# Patient Record
Sex: FEMALE | Race: WHITE | NOT HISPANIC OR LATINO | Employment: FULL TIME | URBAN - METROPOLITAN AREA
[De-identification: names, ages, dates, MRNs, and addresses within clinical notes are randomized per-mention and may not be internally consistent; named-entity substitution may affect disease eponyms.]

---

## 2018-04-16 ENCOUNTER — HOSPITAL ENCOUNTER (EMERGENCY)
Facility: HOSPITAL | Age: 58
Discharge: HOME | End: 2018-04-16
Attending: EMERGENCY MEDICINE
Payer: COMMERCIAL

## 2018-04-16 ENCOUNTER — APPOINTMENT (EMERGENCY)
Dept: RADIOLOGY | Facility: HOSPITAL | Age: 58
End: 2018-04-16
Attending: EMERGENCY MEDICINE
Payer: COMMERCIAL

## 2018-04-16 VITALS
RESPIRATION RATE: 18 BRPM | DIASTOLIC BLOOD PRESSURE: 76 MMHG | SYSTOLIC BLOOD PRESSURE: 171 MMHG | HEIGHT: 63 IN | WEIGHT: 200 LBS | HEART RATE: 81 BPM | BODY MASS INDEX: 35.44 KG/M2 | OXYGEN SATURATION: 98 % | TEMPERATURE: 98.3 F

## 2018-04-16 DIAGNOSIS — E80.6 HYPERBILIRUBINEMIA: ICD-10-CM

## 2018-04-16 DIAGNOSIS — R74.01 TRANSAMINITIS: ICD-10-CM

## 2018-04-16 DIAGNOSIS — R19.7 DIARRHEA, UNSPECIFIED TYPE: Primary | ICD-10-CM

## 2018-04-16 DIAGNOSIS — F41.9 ANXIETY: ICD-10-CM

## 2018-04-16 DIAGNOSIS — R00.0 TACHYCARDIA: ICD-10-CM

## 2018-04-16 DIAGNOSIS — E87.20 LACTIC ACIDOSIS: ICD-10-CM

## 2018-04-16 LAB
ALBUMIN SERPL-MCNC: 4 G/DL (ref 3.4–5)
ALP SERPL-CCNC: 104 IU/L (ref 35–126)
ALT SERPL-CCNC: 136 IU/L (ref 11–54)
ANION GAP SERPL CALC-SCNC: 15 MEQ/L (ref 3–15)
AST SERPL-CCNC: 183 IU/L (ref 15–41)
ATRIAL RATE: 104
BACTERIA #/AREA URNS HPF: ABNORMAL /HPF
BASE EXCESS BLDV CALC-SCNC: -0.4 MMOL/L
BASOPHILS # BLD: 0.06 K/UL (ref 0.01–0.1)
BASOPHILS NFR BLD: 0.6 %
BILIRUB SERPL-MCNC: 2.3 MG/DL (ref 0.3–1.2)
BILIRUB UR QL STRIP.AUTO: NEGATIVE MG/DL
BUN SERPL-MCNC: 5 MG/DL (ref 8–20)
CALCIUM SERPL-MCNC: 8.9 MG/DL (ref 8.9–10.3)
CHLORIDE SERPL-SCNC: 96 MMOL/L (ref 98–109)
CLARITY UR REFRACT.AUTO: ABNORMAL
CO2 BLDV-SCNC: 24.1 MMOL/L (ref 22–32)
CO2 SERPL-SCNC: 22 MMOL/L (ref 22–32)
COLOR UR AUTO: YELLOW
CREAT SERPL-MCNC: 0.6 MG/DL (ref 0.6–1.1)
DIFFERENTIAL METHOD BLD: ABNORMAL
EOSINOPHIL # BLD: 0.01 K/UL (ref 0.04–0.36)
EOSINOPHIL NFR BLD: 0.1 %
ERYTHROCYTE [DISTWIDTH] IN BLOOD BY AUTOMATED COUNT: 13.3 % (ref 11.7–14.4)
FIO2 ON VENT: ABNORMAL %
GFR SERPL CREATININE-BSD FRML MDRD: >60 ML/MIN/1.73M*2
GLUCOSE SERPL-MCNC: 156 MG/DL (ref 70–99)
GLUCOSE UR STRIP.AUTO-MCNC: NEGATIVE MG/DL
HCO3 BLDV-SCNC: 23.1 MMOL/L (ref 21–28)
HCT VFR BLDCO AUTO: 41.7 % (ref 35–45)
HGB BLD-MCNC: 14.9 G/DL (ref 11.8–15.7)
HGB UR QL STRIP.AUTO: NEGATIVE
HYALINE CASTS #/AREA URNS LPF: ABNORMAL /LPF
IMM GRANULOCYTES # BLD AUTO: 0.04 K/UL (ref 0–0.08)
IMM GRANULOCYTES NFR BLD AUTO: 0.4 %
INHALED O2 CONCENTRATION: ABNORMAL %
KETONES UR STRIP.AUTO-MCNC: NEGATIVE MG/DL
LACTATE SERPL-SCNC: 2.8 MMOL/L (ref 0.4–2)
LACTATE SERPL-SCNC: 3.6 MMOL/L (ref 0.4–2)
LEUKOCYTE ESTERASE UR QL STRIP.AUTO: NEGATIVE
LIPASE SERPL-CCNC: 28 U/L (ref 20–51)
LYMPHOCYTES # BLD: 1.24 K/UL (ref 1.2–3.5)
LYMPHOCYTES NFR BLD: 12.8 %
MAGNESIUM SERPL-MCNC: 2.1 MG/DL (ref 1.8–2.5)
MCH RBC QN AUTO: 33.6 PG (ref 28–33.2)
MCHC RBC AUTO-ENTMCNC: 35.7 G/DL (ref 32.2–35.5)
MCV RBC AUTO: 94.1 FL (ref 83–98)
MONOCYTES # BLD: 0.91 K/UL (ref 0.28–0.8)
MONOCYTES NFR BLD: 9.4 %
NEUTROPHILS # BLD: 7.4 K/UL (ref 1.7–7)
NEUTS SEG NFR BLD: 76.7 %
NITRITE UR QL STRIP.AUTO: NEGATIVE
NRBC BLD-RTO: 0 %
P AXIS: 45
PCO2 BLDV: 34 MMHG (ref 41–51)
PDW BLD AUTO: 9.5 FL (ref 9.4–12.3)
PH BLDV: 7.44 [PH] (ref 7.32–7.42)
PH UR STRIP.AUTO: 6.5 [PH]
PLATELET # BLD AUTO: 156 K/UL (ref 150–369)
PO2 BLDV: 48 MMHG (ref 25–40)
POTASSIUM SERPL-SCNC: 3.9 MMOL/L (ref 3.6–5.1)
PR INTERVAL: 178
PROT SERPL-MCNC: 7.7 G/DL (ref 6–8.2)
PROT UR QL STRIP.AUTO: NEGATIVE
QRS DURATION: 78
QT INTERVAL: 336
QTC CALCULATION(BAZETT): 441
R AXIS: 62
RBC # BLD AUTO: 4.43 M/UL (ref 3.93–5.22)
RBC #/AREA URNS HPF: ABNORMAL /HPF
SODIUM SERPL-SCNC: 133 MMOL/L (ref 136–144)
SP GR UR REFRACT.AUTO: <=1.005
SQUAMOUS #/AREA URNS HPF: 1 /HPF
T WAVE AXIS: 40
UROBILINOGEN UR STRIP-ACNC: 0.2 EU/DL
VENTRICULAR RATE: 104
WBC # BLD AUTO: 9.66 K/UL (ref 3.8–10.5)
WBC #/AREA URNS HPF: ABNORMAL /HPF

## 2018-04-16 PROCEDURE — 83605 ASSAY OF LACTIC ACID: CPT | Performed by: EMERGENCY MEDICINE

## 2018-04-16 PROCEDURE — 25800000 HC PHARMACY IV SOLUTIONS: Performed by: EMERGENCY MEDICINE

## 2018-04-16 PROCEDURE — 93005 ELECTROCARDIOGRAM TRACING: CPT | Performed by: EMERGENCY MEDICINE

## 2018-04-16 PROCEDURE — 83735 ASSAY OF MAGNESIUM: CPT | Performed by: EMERGENCY MEDICINE

## 2018-04-16 PROCEDURE — 81001 URINALYSIS AUTO W/SCOPE: CPT | Performed by: EMERGENCY MEDICINE

## 2018-04-16 PROCEDURE — 36415 COLL VENOUS BLD VENIPUNCTURE: CPT | Performed by: EMERGENCY MEDICINE

## 2018-04-16 PROCEDURE — 96361 HYDRATE IV INFUSION ADD-ON: CPT

## 2018-04-16 PROCEDURE — 80053 COMPREHEN METABOLIC PANEL: CPT | Performed by: EMERGENCY MEDICINE

## 2018-04-16 PROCEDURE — 3E0337Z INTRODUCTION OF ELECTROLYTIC AND WATER BALANCE SUBSTANCE INTO PERIPHERAL VEIN, PERCUTANEOUS APPROACH: ICD-10-PCS | Performed by: EMERGENCY MEDICINE

## 2018-04-16 PROCEDURE — 85025 COMPLETE CBC W/AUTO DIFF WBC: CPT | Performed by: EMERGENCY MEDICINE

## 2018-04-16 PROCEDURE — 80074 ACUTE HEPATITIS PANEL: CPT | Performed by: EMERGENCY MEDICINE

## 2018-04-16 PROCEDURE — 83690 ASSAY OF LIPASE: CPT | Performed by: EMERGENCY MEDICINE

## 2018-04-16 PROCEDURE — 76705 ECHO EXAM OF ABDOMEN: CPT

## 2018-04-16 PROCEDURE — 99284 EMERGENCY DEPT VISIT MOD MDM: CPT | Mod: 25

## 2018-04-16 PROCEDURE — 96360 HYDRATION IV INFUSION INIT: CPT

## 2018-04-16 PROCEDURE — 82803 BLOOD GASES ANY COMBINATION: CPT | Performed by: EMERGENCY MEDICINE

## 2018-04-16 RX ORDER — LORAZEPAM 0.5 MG/1
0.5 TABLET ORAL EVERY 6 HOURS PRN
Qty: 14 TABLET | Refills: 0 | Status: SHIPPED | OUTPATIENT
Start: 2018-04-16 | End: 2020-03-24 | Stop reason: ALTCHOICE

## 2018-04-16 RX ORDER — MORPHINE SULFATE 4 MG/ML
4 INJECTION, SOLUTION INTRAMUSCULAR; INTRAVENOUS ONCE
Status: DISCONTINUED | OUTPATIENT
Start: 2018-04-16 | End: 2018-04-16 | Stop reason: HOSPADM

## 2018-04-16 RX ORDER — HYDROCHLOROTHIAZIDE 25 MG/1
25 TABLET ORAL DAILY
COMMUNITY
End: 2020-03-24 | Stop reason: ALTCHOICE

## 2018-04-16 RX ADMIN — SODIUM CHLORIDE 1000 ML: 9 INJECTION, SOLUTION INTRAVENOUS at 13:01

## 2018-04-16 ASSESSMENT — ENCOUNTER SYMPTOMS
CHEST TIGHTNESS: 0
ABDOMINAL PAIN: 1
CHILLS: 0
DIARRHEA: 1
RECENT COUGH: 1
FEVER: 0
COUGH: 1
SHORTNESS OF BREATH: 0

## 2018-04-16 NOTE — DISCHARGE INSTRUCTIONS
Please review all discharge instructions as discussed.  Please be re-evaluated by your primary doctor as soon as possible and be sure to review all laboratory, radiology and other testing with your doctor.  Should your symptoms worsen or not improve, return to emergency room immediately.     Please stay well hydrated and use Pepto-bismol as needed for diarrhea

## 2018-04-16 NOTE — ED PROVIDER NOTES
HPI     Chief Complaint   Patient presents with   • Diarrhea   • Abdominal Pain       Pt states she has been having constant black and tarry diarrhea for about 2 weeks. She went to her PCP today and was sent to the ED for evaluation. Pt states she has been under a lot of stress as well over the last two weeks and states she works for [employer]. 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 Pt denies taking any iron supplements.        History provided by:  Patient and friend   used: No    Diarrhea   Quality:  Watery and black and tarry  Severity:  Moderate  Onset quality:  Gradual  Duration:  2 weeks  Timing:  Constant  Progression:  Unchanged  Relieved by:  None tried  Worsened by:  Nothing  Ineffective treatments:  None tried  Associated symptoms: abdominal pain and cough (pt states she coughed up blood yesterday)    Associated symptoms: no chills and no fever    Abdominal Pain   Associated symptoms: cough (Pt states she coughed up blood yesterday) and diarrhea    Associated symptoms: no chest pain, no chills, no fever and no shortness of breath         Patient History     Past Medical History:   Diagnosis Date   • Anxiety        History reviewed. No pertinent surgical history.    History reviewed. No pertinent family history.    Social History   Substance Use Topics   • Smoking status: Never Smoker   • Smokeless tobacco: Never Used   • Alcohol use No       Systems Reviewed from Nursing Triage:          Review of Systems     Review of Systems   Constitutional: Negative for chills and fever.   Respiratory: Positive for cough (Pt states she coughed up blood yesterday). Negative for chest tightness and shortness of breath.    Cardiovascular: Negative for chest pain.   Gastrointestinal: Positive for abdominal pain and diarrhea.   All other systems reviewed and are negative.       Physical Exam     ED Triage Vitals [04/16/18 1127]   Temp Heart Rate Resp BP  "SpO2   36.8 °C (98.3 °F) (!) 118 18 (!) 166/88 96 %      Temp Source Heart Rate Source Patient Position BP Location FiO2 (%) (Set)   Oral -- Sitting Right upper arm --       Pulse Ox %: 96 % (04/16/18 1155)  Pulse Ox Interpretation: Normal (04/16/18 1155)          Patient Vitals for the past 24 hrs:   BP Temp Temp src Pulse Resp SpO2 Height Weight   04/16/18 1127 (!) 166/88 36.8 °C (98.3 °F) Oral (!) 118 18 96 % 1.6 m (5' 3\") 90.7 kg (200 lb)           Physical Exam   Constitutional: She appears well-developed and well-nourished. No distress.   HENT:   Head: Normocephalic and atraumatic.   Eyes: Conjunctivae are normal. No scleral icterus.   Neck: Neck supple. No JVD present.   Cardiovascular: Normal rate, regular rhythm and normal heart sounds.    Pulmonary/Chest: Effort normal and breath sounds normal. No respiratory distress.   Abdominal: Soft. There is no tenderness.   Genitourinary:   Genitourinary Comments: Heme negative.  Dark green stool.   Musculoskeletal: She exhibits no edema.   Neurological: She is alert. She has normal strength.   Skin: Skin is warm and dry.   Psychiatric: She has a normal mood and affect. Her behavior is normal.   Nursing note and vitals reviewed.           Procedures    ED Course & MDM     Labs Reviewed   CBC AND DIFFERENTIAL    Narrative:     The following orders were created for panel order CBC and differential.  Procedure                               Abnormality         Status                     ---------                               -----------         ------                     CBC[[id number]]                                                                          Diff Count[[id number]]                                                                     Please view results for these tests on the individual orders.   COMPREHENSIVE METABOLIC PANEL   LACTATE, VENOUS   LIPASE   URINALYSIS REFLEX CULTURE    Narrative:     The following orders were created for panel order Urinalysis " with Reflex Culture.  Procedure                               Abnormality         Status                     ---------                               -----------         ------                     UA Reflex to Culture (Mac...[[id number]]                                                   Please view results for these tests on the individual orders.   CBC   DIFF COUNT   UA REFLEX CULTURE (MACROSCOPIC)       ECG 12 lead    (Results Pending)           [facility]         ED Course as of Apr 16 1847   Mon Apr 16, 2018   1241 58-year-old female history of anxiety complains of 1 week of dark green's stool and diarrhea.  Has occasional low abdominal tightness.  Denies urinary complaints or fever.  No nausea, vomiting, or bloody stools.  Saw PCP today who recommended patient go to ED for further evaluation.  Denies new meds, recent travel or sick contacts.  No family history of Crohn's or UC.Vitals noted, well-appearing, soft abdomen, heme negative stoolsWe will check labs, hydrate and reassess.    Lactate 3.6--prob due to starvation ketosis; doubt severe sepsis. Will check VBG  [SUZAN]   1429 VBG stable  Na 133  / with TB 2.3  Pulse 102  Pt asking to eat. Says she has not eaten for 3 days because she has been 'stressed' at work.  [SUZAN]   1610 RUQ sono: Hepatomegaly and hepatic steatosis.  [SUZAN]   1610 All details of case d/w GI fellow who believes if stable for outpatient followup.  [SUZAN]   1644 Repeat lactate 2.8  [SUZAN]      ED Course User Index  [SUZAN] Cheryl Marquis MD         Clinical Impressions as of Apr 16 1847   Diarrhea, unspecified type   Tachycardia   Lactic acidosis   Transaminitis   Hyperbilirubinemia   Anxiety     Disposition:           Scribe Attestation  By signing my name below, I, Leonard Bernard, attest that this documentation has been prepared under the direction and in the presence of Dr. Marquis.    4/16/2018 12:17 PM    I, Cheryl Marquis, personally performed the services described in this documentation,  as documented by the scribe in my presence, and it is both accurate and complete.  4/16/2018 12:40 PM           Leonard Bernard  04/16/18 1219       Cheryl Marquis MD  04/16/18 1742

## 2018-04-16 NOTE — LETTER
April 16, 2018     Patient: Yasmeen Linares   YOB: 1960   Date of Visit: 4/16/2018       To Whom it May Concern:    Yasmeen Linares was seen in my clinic on 4/16/2018  . Please excuse Yasmeen for her absence from work on this day and 4/17/18.    If you have any questions or concerns, please don't hesitate to call.         Sincerely,

## 2018-04-17 LAB
HAV IGM SER QL: NONREACTIVE S/CO
HBV CORE IGM SER QL: NONREACTIVE S/CO
HBV SURFACE AG SER QL: NONREACTIVE S/CO
HCV AB SER QL: NONREACTIVE S/CO

## 2018-05-02 ENCOUNTER — TRANSCRIBE ORDERS (OUTPATIENT)
Dept: SCHEDULING | Age: 58
End: 2018-05-02

## 2018-05-02 DIAGNOSIS — R94.5 NONSPECIFIC ABNORMAL RESULTS OF LIVER FUNCTION STUDY: Primary | ICD-10-CM

## 2018-06-07 ENCOUNTER — TRANSCRIBE ORDERS (OUTPATIENT)
Dept: SCHEDULING | Age: 58
End: 2018-06-07

## 2018-06-07 DIAGNOSIS — R14.0 ABDOMINAL DISTENSION (GASEOUS): Primary | ICD-10-CM

## 2018-06-23 ENCOUNTER — HOSPITAL ENCOUNTER (OUTPATIENT)
Dept: RADIOLOGY | Facility: HOSPITAL | Age: 58
Discharge: HOME | End: 2018-06-23
Attending: INTERNAL MEDICINE
Payer: COMMERCIAL

## 2018-06-23 DIAGNOSIS — R14.0 ABDOMINAL DISTENSION (GASEOUS): ICD-10-CM

## 2018-06-23 PROCEDURE — 25500000 HC DRUGS/INCIDENT RAD: Performed by: RADIOLOGY

## 2018-06-23 PROCEDURE — 63600105 HC IODINE BASED CONTRAST: Performed by: RADIOLOGY

## 2018-06-23 PROCEDURE — 74177 CT ABD & PELVIS W/CONTRAST: CPT

## 2018-06-23 RX ADMIN — IOHEXOL 125 ML: 350 INJECTION, SOLUTION INTRAVENOUS at 13:43

## 2018-06-23 RX ADMIN — BARIUM SULFATE 900 ML: 21 SUSPENSION ORAL at 14:15

## 2019-04-04 ENCOUNTER — LAB REQUISITION (OUTPATIENT)
Dept: LAB | Facility: HOSPITAL | Age: 59
End: 2019-04-04
Attending: NURSE PRACTITIONER
Payer: COMMERCIAL

## 2019-04-04 DIAGNOSIS — F10.239 ALCOHOL DEPENDENCE WITH WITHDRAWAL (CMS/HCC): ICD-10-CM

## 2019-04-04 LAB — AMMONIA PLAS-SCNC: 97 UMOL/L (ref 11–35)

## 2019-04-04 PROCEDURE — 82140 ASSAY OF AMMONIA: CPT | Performed by: NURSE PRACTITIONER

## 2019-04-16 ENCOUNTER — LAB REQUISITION (OUTPATIENT)
Dept: LAB | Facility: HOSPITAL | Age: 59
End: 2019-04-16
Attending: NURSE PRACTITIONER
Payer: COMMERCIAL

## 2019-04-16 DIAGNOSIS — F10.239 ALCOHOL DEPENDENCE WITH WITHDRAWAL (CMS/HCC): ICD-10-CM

## 2019-04-16 LAB
ALBUMIN SERPL-MCNC: 3.8 G/DL (ref 3.4–5)
ALP SERPL-CCNC: 114 IU/L (ref 35–126)
ALT SERPL-CCNC: 115 IU/L (ref 11–54)
AST SERPL-CCNC: 120 IU/L (ref 15–41)
BILIRUB DIRECT SERPL-MCNC: 0.9 MG/DL
BILIRUB SERPL-MCNC: 2.2 MG/DL (ref 0.3–1.2)
PROT SERPL-MCNC: 6.9 G/DL (ref 6–8.2)

## 2019-04-16 PROCEDURE — 80076 HEPATIC FUNCTION PANEL: CPT | Performed by: NURSE PRACTITIONER

## 2019-04-22 ENCOUNTER — LAB REQUISITION (OUTPATIENT)
Dept: LAB | Facility: HOSPITAL | Age: 59
End: 2019-04-22
Attending: HOSPITALIST
Payer: COMMERCIAL

## 2019-04-22 ENCOUNTER — TRANSCRIBE ORDERS (OUTPATIENT)
Dept: SCHEDULING | Age: 59
End: 2019-04-22

## 2019-04-22 DIAGNOSIS — K76.6 PORTAL HYPERTENSION (CMS/HCC): ICD-10-CM

## 2019-04-22 DIAGNOSIS — K70.31 ALCOHOLIC CIRRHOSIS OF LIVER WITH ASCITES (CMS/HCC): Primary | ICD-10-CM

## 2019-04-22 DIAGNOSIS — F10.239 ALCOHOL DEPENDENCE WITH WITHDRAWAL (CMS/HCC): ICD-10-CM

## 2019-04-22 LAB
ANION GAP SERPL CALC-SCNC: 11 MEQ/L (ref 3–15)
BUN SERPL-MCNC: 5 MG/DL (ref 8–20)
CALCIUM SERPL-MCNC: 9.6 MG/DL (ref 8.9–10.3)
CHLORIDE SERPL-SCNC: 109 MEQ/L (ref 98–109)
CO2 SERPL-SCNC: 21 MEQ/L (ref 22–32)
CREAT SERPL-MCNC: 0.6 MG/DL
GFR SERPL CREATININE-BSD FRML MDRD: >60 ML/MIN/1.73M*2
GLUCOSE SERPL-MCNC: 115 MG/DL (ref 70–99)
MAGNESIUM SERPL-MCNC: 1.8 MG/DL (ref 1.8–2.5)
POTASSIUM SERPL-SCNC: 4.1 MEQ/L (ref 3.6–5.1)
SODIUM SERPL-SCNC: 141 MEQ/L (ref 136–144)

## 2019-04-22 PROCEDURE — 80048 BASIC METABOLIC PNL TOTAL CA: CPT | Performed by: HOSPITALIST

## 2019-04-22 PROCEDURE — 83735 ASSAY OF MAGNESIUM: CPT | Performed by: HOSPITALIST

## 2019-04-24 ENCOUNTER — HOSPITAL ENCOUNTER (OUTPATIENT)
Dept: RADIOLOGY | Age: 59
Discharge: HOME | End: 2019-04-24
Attending: INTERNAL MEDICINE
Payer: COMMERCIAL

## 2019-04-24 DIAGNOSIS — K76.6 PORTAL HYPERTENSION (CMS/HCC): ICD-10-CM

## 2019-04-24 DIAGNOSIS — K70.31 ALCOHOLIC CIRRHOSIS OF LIVER WITH ASCITES (CMS/HCC): ICD-10-CM

## 2019-04-24 RX ORDER — GADOBUTROL 604.72 MG/ML
8 INJECTION INTRAVENOUS ONCE
Status: COMPLETED | OUTPATIENT
Start: 2019-04-24 | End: 2019-04-24

## 2019-04-24 RX ADMIN — GADOBUTROL 8 ML: 604.72 INJECTION INTRAVENOUS at 10:19

## 2019-04-30 ENCOUNTER — HOSPITAL ENCOUNTER (OUTPATIENT)
Facility: HOSPITAL | Age: 59
Discharge: HOME | End: 2019-04-30
Attending: INTERNAL MEDICINE | Admitting: INTERNAL MEDICINE
Payer: COMMERCIAL

## 2019-04-30 ENCOUNTER — ANESTHESIA (OUTPATIENT)
Dept: ENDOSCOPY | Facility: HOSPITAL | Age: 59
End: 2019-04-30
Payer: COMMERCIAL

## 2019-04-30 ENCOUNTER — ANESTHESIA EVENT (OUTPATIENT)
Dept: ENDOSCOPY | Facility: HOSPITAL | Age: 59
End: 2019-04-30
Payer: COMMERCIAL

## 2019-04-30 VITALS
TEMPERATURE: 97.7 F | HEIGHT: 64 IN | BODY MASS INDEX: 29.88 KG/M2 | OXYGEN SATURATION: 96 % | HEART RATE: 73 BPM | DIASTOLIC BLOOD PRESSURE: 67 MMHG | SYSTOLIC BLOOD PRESSURE: 125 MMHG | RESPIRATION RATE: 17 BRPM | WEIGHT: 175 LBS

## 2019-04-30 PROCEDURE — 37000002 HC ANESTHESIA MAC: Performed by: INTERNAL MEDICINE

## 2019-04-30 PROCEDURE — 63600000 HC DRUGS/DETAIL CODE: Mod: JW | Performed by: NURSE ANESTHETIST, CERTIFIED REGISTERED

## 2019-04-30 PROCEDURE — 0DJ08ZZ INSPECTION OF UPPER INTESTINAL TRACT, VIA NATURAL OR ARTIFICIAL OPENING ENDOSCOPIC: ICD-10-PCS | Performed by: INTERNAL MEDICINE

## 2019-04-30 PROCEDURE — 75000081 HC EGD W WO BRUSH WASH: Performed by: INTERNAL MEDICINE

## 2019-04-30 PROCEDURE — 25800000 HC PHARMACY IV SOLUTIONS: Performed by: NURSE ANESTHETIST, CERTIFIED REGISTERED

## 2019-04-30 RX ORDER — IBUPROFEN 200 MG
16-32 TABLET ORAL AS NEEDED
Status: CANCELLED | OUTPATIENT
Start: 2019-04-30 | End: 2019-07-29

## 2019-04-30 RX ORDER — DEXTROSE 50 % IN WATER (D50W) INTRAVENOUS SYRINGE
25 AS NEEDED
Status: DISCONTINUED | OUTPATIENT
Start: 2019-04-30 | End: 2019-04-30 | Stop reason: HOSPADM

## 2019-04-30 RX ORDER — DEXTROSE 40 %
15-30 GEL (GRAM) ORAL AS NEEDED
Status: DISCONTINUED | OUTPATIENT
Start: 2019-04-30 | End: 2019-04-30 | Stop reason: HOSPADM

## 2019-04-30 RX ORDER — PROPOFOL 200MG/20ML
SYRINGE (ML) INTRAVENOUS AS NEEDED
Status: DISCONTINUED | OUTPATIENT
Start: 2019-04-30 | End: 2019-04-30 | Stop reason: SURG

## 2019-04-30 RX ORDER — DEXTROSE 50 % IN WATER (D50W) INTRAVENOUS SYRINGE
25 AS NEEDED
Status: CANCELLED | OUTPATIENT
Start: 2019-04-30 | End: 2019-07-29

## 2019-04-30 RX ORDER — IBUPROFEN 200 MG
16-32 TABLET ORAL AS NEEDED
Status: DISCONTINUED | OUTPATIENT
Start: 2019-04-30 | End: 2019-04-30 | Stop reason: HOSPADM

## 2019-04-30 RX ORDER — PROPOFOL 10 MG/ML
INJECTION, EMULSION INTRAVENOUS CONTINUOUS PRN
Status: DISCONTINUED | OUTPATIENT
Start: 2019-04-30 | End: 2019-04-30 | Stop reason: SURG

## 2019-04-30 RX ORDER — SODIUM CHLORIDE 9 MG/ML
INJECTION, SOLUTION INTRAVENOUS CONTINUOUS
Status: DISCONTINUED | OUTPATIENT
Start: 2019-04-30 | End: 2019-04-30 | Stop reason: HOSPADM

## 2019-04-30 RX ORDER — SODIUM CHLORIDE 9 MG/ML
INJECTION, SOLUTION INTRAVENOUS CONTINUOUS PRN
Status: DISCONTINUED | OUTPATIENT
Start: 2019-04-30 | End: 2019-04-30 | Stop reason: SURG

## 2019-04-30 RX ORDER — DEXTROSE 40 %
15-30 GEL (GRAM) ORAL AS NEEDED
Status: CANCELLED | OUTPATIENT
Start: 2019-04-30 | End: 2019-07-29

## 2019-04-30 RX ADMIN — PROPOFOL 20 MG: 10 INJECTION, EMULSION INTRAVENOUS at 09:22

## 2019-04-30 RX ADMIN — PROPOFOL 120 MG: 10 INJECTION, EMULSION INTRAVENOUS at 09:18

## 2019-04-30 RX ADMIN — PROPOFOL 40 MG: 10 INJECTION, EMULSION INTRAVENOUS at 09:24

## 2019-04-30 RX ADMIN — PROPOFOL 160 MCG/KG/MIN: 10 INJECTION, EMULSION INTRAVENOUS at 09:18

## 2019-04-30 RX ADMIN — SODIUM CHLORIDE: 900 INJECTION, SOLUTION INTRAVENOUS at 09:13

## 2019-04-30 ASSESSMENT — PAIN SCALES - GENERAL: PAIN_LEVEL: 0

## 2019-04-30 ASSESSMENT — PAIN - FUNCTIONAL ASSESSMENT
PAIN_FUNCTIONAL_ASSESSMENT: NO/DENIES PAIN

## 2019-04-30 NOTE — OP NOTE
_______________________________________________________________________________  Patient Name: Yasmeen Linares           Procedure Date: 4/30/2019 8:52 AM  MRN: [id number]                     Account Number: [id number]  YOB: 1960              Age: 59  Gender: Female                        Note Status: Finalized  Attending MD: KIMBERLY ORDONEZ  _______________________________________________________________________________  Procedure:           Upper GI endoscopy  Indications:         Cirrhosis rule out esophageal varices  Providers:           KODI GREEN MD~PETER (Doctor)  Referring MD:        MICHELET ALLAN  Requesting Provider:  Medicines:           See the Anesthesia note for documentation of the  administered medications  Complications:       No immediate complications. Estimated blood loss: None.  _______________________________________________________________________________  Procedure:           Pre-Anesthesia Assessment:  - Prior to the procedure, a History and Physical was  performed, and patient medications, allergies and  sensitivities were reviewed. The patient's tolerance of  previous anesthesia was reviewed.  - The risks and benefits of the procedure and the  sedation options and risks were discussed with the  patient. All questions were answered and informed  consent was obtained.  After obtaining informed consent, the endoscope was  passed under direct vision. Throughout the procedure,  the patient's blood pressure, pulse, and oxygen  saturations were monitored continuously. The Endoscope  was introduced through the mouth, and advanced to the  second part of duodenum. The upper GI endoscopy was  accomplished without difficulty. The patient tolerated  the procedure.  Findings:  The examined esophagus was normal. There were no varices appreciated on  careful examination of the esophagus.  There is no endoscopic evidence of erythema, erythema or inflammatory  changes  suggestive of gastritis, inflammation, mucosal abnormalities or  portal hypertension gastropathy in the entire examined stomach.  The examined duodenum was normal.  Impression:          - Normal esophagus.  - Normal examined duodenum.  - No specimens collected.  Recommendation:      - Discharge patient to home (ambulatory).  - Repeat the upper endoscopy in 2 years for surveillance.  - Return to GI office as previously scheduled.  - Patient has a contact number available for  emergencies. The signs and symptoms of potential delayed  complications were discussed with the patient. Return to  normal activities tomorrow. Written discharge  instructions were provided to the patient.  Procedure Code(s):   --- Professional ---  69646, Esophagogastroduodenoscopy, flexible, transoral;  diagnostic, including collection of specimen(s) by  brushing or washing, when performed (separate procedure)  Diagnosis Code(s):   --- Professional ---  K74.60, Unspecified cirrhosis of liver  CPT copyright 2015 American Medical Association. All rights reserved.  The codes documented in this report are preliminary and upon  review may  be revised to meet current compliance requirements.  Attending Participation:  I was present and participated during the entire procedure, including  non-key portions.  _____________________  KODI GREEN MD~PETER  4/30/2019 9:55:17 AM  This report has been signed electronically.  Number of Addenda: 0  Note Initiated On: 4/30/2019 8:52 AM

## 2019-04-30 NOTE — H&P
"   GI Consult Note          Subjective   Yasmeen Linares is a 59 y.o. female who was admitted for Alcoholic cirrhosis of liver without ascites (CMS/HCC) (HCC) [K70.30]  Portal hypertension (CMS/HCC) [K76.6].         Past Medical History:   Diagnosis Date   • Anxiety      No past surgical history on file.  Allergies   Allergen Reactions   • Cefzil [Cefprozil] Shortness of breath       Home Medications:  •  gabapentin, Take 100 mg by mouth daily.  •  hydrochlorothiazide, Take 25 mg by mouth daily.  •  lactulose, Take 10 g by mouth daily.  •  LORazepam, Take 1 tablet (0.5 mg total) by mouth every 6 (six) hours as needed for anxiety for up to 14 doses.  •  melatonin, Take 6 mg by mouth nightly.  •  traZODone, Take 75 mg by mouth nightly.        Physical Exam    Physical Exam  BP (!) 160/75   Temp 36.5 °C (97.7 °F) (Tympanic)   Resp 16   Ht 1.626 m (5' 4\")   Wt 79.4 kg (175 lb)   BMI 30.04 kg/m²     General appearance: no distress  Head: normocephalic  Lungs: clear to auscultation anteriorly   Heart: regular rate   Abdomen: soft, non-tender; bowel sounds normal; no masses, no organomegaly  Neurologic: awake and alert and oriented        Assessment   59 year old female with alcoholic cirrhosis for egd to surveil for varices.             Jerson Lee MD  4/30/2019       "

## 2019-04-30 NOTE — DISCHARGE INSTRUCTIONS
Please see Dr. Lee in the office as scheduled.  Please schedule repeat surveillance in 2 years.      Esophagogastroduodenoscopy, Care After  Refer to this sheet in the next few weeks. These instructions provide you with information about caring for yourself after your procedure. Your health care provider may also give you more specific instructions. Your treatment has been planned according to current medical practices, but problems sometimes occur. Call your health care provider if you have any problems or questions after your procedure.  What can I expect after the procedure?  After the procedure, it is common to have:  · A sore throat.  · Nausea.  · Bloating.  · Dizziness.  · Fatigue.  Follow these instructions at home:  · Do not eat or drink anything until the numbing medicine (local anesthetic) has worn off and your gag reflex has returned. You will know that the local anesthetic has worn off when you can swallow comfortably.  · Do not drive for 24 hours if you received a medicine to help you relax (sedative).  · If your health care provider took a tissue sample for testing during the procedure, make sure to get your test results. This is your responsibility. Ask your health care provider or the department performing the test when your results will be ready.  · Keep all follow-up visits as told by your health care provider. This is important.  Contact a health care provider if:  · You cannot stop coughing.  · You are not urinating.  · You are urinating less than usual.  Get help right away if:  · You have trouble swallowing.  · You cannot eat or drink.  · You have throat or chest pain that gets worse.  · You are dizzy or light-headed.  · You faint.  · You have nausea or vomiting.  · You have chills.  · You have a fever.  · You have severe abdominal pain.  · You have black, tarry, or bloody stools.  This information is not intended to replace advice given to you by your health care provider. Make sure you  discuss any questions you have with your health care provider.  Document Released: 12/04/2013 Document Revised: 05/25/2017 Document Reviewed: 11/10/2016  [employer] Interactive Patient Education © 2018 [employer]

## 2019-04-30 NOTE — ANESTHESIA PREPROCEDURE EVALUATION
Anesthesia ROS/MED HX      Neuro/Psych    Substance abuse (alcohol abuse)  GI/Hepatic   liver disease, portal hypertension and cirrhosis  Endo/Other  Body Habitus: Obese    Past Medical History:   Diagnosis Date   • Anxiety        No past surgical history on file.    Physical Exam    Airway   Mallampati: II   TM distance: >3 FB   Neck ROM: full  Cardiovascular    Rhythm: regular   Rate: normal  Pulmonary    Decreased breath sounds        Anesthesia Plan    Plan: MAC    Technique: MAC     Airway: natural airway / supplemental oxygen   ASA 3  Anesthetic plan and risks discussed with: patient  Induction:    intravenous   Postop Plan:   Patient Disposition: phase II then home   Pain Management: IV analgesics

## 2019-04-30 NOTE — ANESTHESIA POSTPROCEDURE EVALUATION
Patient: Yasmeen Linares    Procedure Summary     Date:  04/30/19 Room / Location:  [facility] (A) / [facility]    Anesthesia Start:  0913 Anesthesia Stop:  0939    Procedure:  EGD (N/A ) Diagnosis:       Alcoholic cirrhosis of liver without ascites (CMS/HCC) (HCC)      Portal hypertension (CMS/HCC)      (ALC without Ascites / Portal Hypertension)    Provider:  Jerson Lee MD Responsible Provider:  Reagan Holden MD    Anesthesia Type:  MAC ASA Status:  3          Anesthesia Type: MAC  PACU Vitals  4/30/2019 0936 - 4/30/2019 1027      4/30/2019 0940 4/30/2019 0945 4/30/2019 0950 4/30/2019 1000    BP: 128/66 - 128/66 125/67    Temp: - 36.5 °C (97.7 °F) - -    Pulse: 86 - 78 73    Resp: 17 16 15 17    SpO2: 95 % - 96 % 96 %            Anesthesia Post Evaluation    Pain score: 0  Pain management: satisfactory to patient  Mode of pain management: IV medication  Patient location during evaluation: PACU  Patient participation: complete - patient participated  Level of consciousness: responsive to touch  Cardiovascular status: acceptable  Airway Patency: adequate  Respiratory status: acceptable  Hydration status: stable  Anesthetic complications: no

## 2019-05-15 ENCOUNTER — TRANSCRIBE ORDERS (OUTPATIENT)
Dept: LAB | Facility: HOSPITAL | Age: 59
End: 2019-05-15

## 2019-05-15 ENCOUNTER — APPOINTMENT (OUTPATIENT)
Dept: LAB | Facility: HOSPITAL | Age: 59
End: 2019-05-15
Attending: INTERNAL MEDICINE
Payer: COMMERCIAL

## 2019-05-15 DIAGNOSIS — I10 ESSENTIAL (PRIMARY) HYPERTENSION: ICD-10-CM

## 2019-05-15 DIAGNOSIS — K70.31 ALCOHOLIC CIRRHOSIS OF LIVER WITH ASCITES (CMS/HCC): ICD-10-CM

## 2019-05-15 DIAGNOSIS — K76.6 PORTAL HYPERTENSION (CMS/HCC): ICD-10-CM

## 2019-05-15 DIAGNOSIS — Z00.01 ENCOUNTER FOR GENERAL ADULT MEDICAL EXAMINATION WITH ABNORMAL FINDINGS: ICD-10-CM

## 2019-05-15 DIAGNOSIS — R94.5 ABNORMAL RESULTS OF LIVER FUNCTION STUDIES: ICD-10-CM

## 2019-05-15 DIAGNOSIS — R73.01 IMPAIRED FASTING GLUCOSE: ICD-10-CM

## 2019-05-15 DIAGNOSIS — K70.31 ALCOHOLIC CIRRHOSIS OF LIVER WITH ASCITES (CMS/HCC): Primary | ICD-10-CM

## 2019-05-15 DIAGNOSIS — R73.01 IMPAIRED FASTING GLUCOSE: Primary | ICD-10-CM

## 2019-05-15 LAB
ALBUMIN SERPL-MCNC: 4 G/DL (ref 3.4–5)
ALP SERPL-CCNC: 90 IU/L (ref 35–126)
ALT SERPL-CCNC: 59 IU/L (ref 11–54)
ANION GAP SERPL CALC-SCNC: 10 MEQ/L (ref 3–15)
APTT PPP: 81 SEC (ref 23–35)
AST SERPL-CCNC: 60 IU/L (ref 15–41)
BACTERIA URNS QL MICRO: ABNORMAL /HPF
BASOPHILS # BLD: 0.05 K/UL (ref 0.01–0.1)
BASOPHILS NFR BLD: 1 %
BILIRUB SERPL-MCNC: 1.6 MG/DL (ref 0.3–1.2)
BILIRUB UR QL STRIP.AUTO: NEGATIVE MG/DL
BUN SERPL-MCNC: 6 MG/DL (ref 8–20)
CALCIUM SERPL-MCNC: 9.3 MG/DL (ref 8.9–10.3)
CHLORIDE SERPL-SCNC: 106 MEQ/L (ref 98–109)
CHOLEST SERPL-MCNC: 189 MG/DL
CLARITY UR REFRACT.AUTO: ABNORMAL
CO2 SERPL-SCNC: 24 MEQ/L (ref 22–32)
COLOR UR AUTO: YELLOW
CREAT SERPL-MCNC: 0.7 MG/DL
DIFFERENTIAL METHOD BLD: ABNORMAL
EOSINOPHIL # BLD: 0.14 K/UL (ref 0.04–0.36)
EOSINOPHIL NFR BLD: 2.7 %
ERYTHROCYTE [DISTWIDTH] IN BLOOD BY AUTOMATED COUNT: 11.7 % (ref 11.7–14.4)
EST. AVERAGE GLUCOSE BLD GHB EST-MCNC: 123 MG/DL
GFR SERPL CREATININE-BSD FRML MDRD: >60 ML/MIN/1.73M*2
GLUCOSE SERPL-MCNC: 111 MG/DL (ref 70–99)
GLUCOSE UR STRIP.AUTO-MCNC: NEGATIVE MG/DL
HBA1C MFR BLD HPLC: 5.9 %
HCT VFR BLDCO AUTO: 41.2 %
HDLC SERPL-MCNC: 30 MG/DL
HDLC SERPL: 6.3 {RATIO}
HGB BLD-MCNC: 14.5 G/DL
HGB UR QL STRIP.AUTO: NEGATIVE
HYALINE CASTS #/AREA URNS LPF: ABNORMAL /LPF
IMM GRANULOCYTES # BLD AUTO: 0.01 K/UL (ref 0–0.08)
IMM GRANULOCYTES NFR BLD AUTO: 0.2 %
INR PPP: 1.1 INR
KETONES UR STRIP.AUTO-MCNC: NEGATIVE MG/DL
LDLC SERPL CALC-MCNC: 141 MG/DL
LEUKOCYTE ESTERASE UR QL STRIP.AUTO: NEGATIVE
LYMPHOCYTES # BLD: 0.91 K/UL (ref 1.2–3.5)
LYMPHOCYTES NFR BLD: 17.4 %
MCH RBC QN AUTO: 32.7 PG (ref 28–33.2)
MCHC RBC AUTO-ENTMCNC: 35.2 G/DL (ref 32.2–35.5)
MCV RBC AUTO: 92.8 FL (ref 83–98)
MONOCYTES # BLD: 0.54 K/UL (ref 0.28–0.8)
MONOCYTES NFR BLD: 10.3 %
NEUTROPHILS # BLD: 3.57 K/UL (ref 1.7–7)
NEUTS SEG NFR BLD: 68.4 %
NITRITE UR QL STRIP.AUTO: NEGATIVE
NONHDLC SERPL-MCNC: 159 MG/DL
NRBC BLD-RTO: 0 %
PDW BLD AUTO: 11.3 FL (ref 9.4–12.3)
PH UR STRIP.AUTO: 6 [PH]
PLATELET # BLD AUTO: 115 K/UL
POTASSIUM SERPL-SCNC: 3.9 MEQ/L (ref 3.6–5.1)
PROT SERPL-MCNC: 7.1 G/DL (ref 6–8.2)
PROT UR QL STRIP.AUTO: NEGATIVE
PROTHROMBIN TIME: 14.1 SEC (ref 12.2–14.5)
RBC # BLD AUTO: 4.44 M/UL (ref 3.93–5.22)
RBC #/AREA URNS HPF: ABNORMAL /HPF
SODIUM SERPL-SCNC: 140 MEQ/L (ref 136–144)
SP GR UR REFRACT.AUTO: 1.01
SQUAMOUS URNS QL MICRO: 1 /HPF
TRIGL SERPL-MCNC: 92 MG/DL (ref 30–149)
TSH SERPL DL<=0.05 MIU/L-ACNC: 1.65 MIU/L (ref 0.34–5.6)
UROBILINOGEN UR STRIP-ACNC: 1 EU/DL
WBC # BLD AUTO: 5.22 K/UL
WBC #/AREA URNS HPF: ABNORMAL /HPF

## 2019-05-15 PROCEDURE — 36415 COLL VENOUS BLD VENIPUNCTURE: CPT

## 2019-05-15 PROCEDURE — 81001 URINALYSIS AUTO W/SCOPE: CPT

## 2019-05-15 PROCEDURE — 84443 ASSAY THYROID STIM HORMONE: CPT

## 2019-05-15 PROCEDURE — 85025 COMPLETE CBC W/AUTO DIFF WBC: CPT

## 2019-05-15 PROCEDURE — 85610 PROTHROMBIN TIME: CPT

## 2019-05-15 PROCEDURE — 82105 ALPHA-FETOPROTEIN SERUM: CPT

## 2019-05-15 PROCEDURE — 80061 LIPID PANEL: CPT

## 2019-05-15 PROCEDURE — 83036 HEMOGLOBIN GLYCOSYLATED A1C: CPT

## 2019-05-15 PROCEDURE — 80053 COMPREHEN METABOLIC PANEL: CPT

## 2019-05-15 PROCEDURE — 85730 THROMBOPLASTIN TIME PARTIAL: CPT

## 2019-05-16 LAB — AFP SERPL-MCNC: 4 NG/ML

## 2019-08-30 ENCOUNTER — TRANSCRIBE ORDERS (OUTPATIENT)
Dept: SCHEDULING | Age: 59
End: 2019-08-30

## 2019-08-30 DIAGNOSIS — K70.30 ALCOHOLIC CIRRHOSIS OF LIVER WITHOUT ASCITES (CMS/HCC): Primary | ICD-10-CM

## 2019-10-01 ENCOUNTER — HOSPITAL ENCOUNTER (OUTPATIENT)
Dept: RADIOLOGY | Facility: HOSPITAL | Age: 59
Discharge: HOME | End: 2019-10-01
Attending: INTERNAL MEDICINE
Payer: COMMERCIAL

## 2019-10-01 ENCOUNTER — APPOINTMENT (OUTPATIENT)
Dept: LAB | Facility: HOSPITAL | Age: 59
End: 2019-10-01
Attending: INTERNAL MEDICINE
Payer: COMMERCIAL

## 2019-10-01 ENCOUNTER — TRANSCRIBE ORDERS (OUTPATIENT)
Dept: SCHEDULING | Age: 59
End: 2019-10-01

## 2019-10-01 DIAGNOSIS — K70.30 ALCOHOLIC CIRRHOSIS OF LIVER WITHOUT ASCITES (CMS/HCC): ICD-10-CM

## 2019-10-01 DIAGNOSIS — K70.30 ALCOHOLIC CIRRHOSIS OF LIVER WITHOUT ASCITES (CMS/HCC): Primary | ICD-10-CM

## 2019-10-01 LAB
AFP SERPL-MCNC: 2.4 NG/ML
ALBUMIN SERPL-MCNC: 4.2 G/DL (ref 3.4–5)
ALP SERPL-CCNC: 89 IU/L (ref 35–126)
ALT SERPL-CCNC: 25 IU/L (ref 11–54)
ANION GAP SERPL CALC-SCNC: 9 MEQ/L (ref 3–15)
APTT PPP: 34 SEC (ref 23–35)
AST SERPL-CCNC: 25 IU/L (ref 15–41)
BASOPHILS # BLD: 0.04 K/UL (ref 0.01–0.1)
BASOPHILS NFR BLD: 0.8 %
BILIRUB SERPL-MCNC: 1.2 MG/DL (ref 0.3–1.2)
BUN SERPL-MCNC: 11 MG/DL (ref 8–20)
CALCIUM SERPL-MCNC: 9.3 MG/DL (ref 8.9–10.3)
CHLORIDE SERPL-SCNC: 105 MEQ/L (ref 98–109)
CO2 SERPL-SCNC: 26 MEQ/L (ref 22–32)
CREAT SERPL-MCNC: 0.6 MG/DL
DIFFERENTIAL METHOD BLD: NORMAL
EOSINOPHIL # BLD: 0.06 K/UL (ref 0.04–0.36)
EOSINOPHIL NFR BLD: 1.3 %
ERYTHROCYTE [DISTWIDTH] IN BLOOD BY AUTOMATED COUNT: 11.9 % (ref 11.7–14.4)
GFR SERPL CREATININE-BSD FRML MDRD: >60 ML/MIN/1.73M*2
GLUCOSE SERPL-MCNC: 106 MG/DL (ref 70–99)
HCT VFR BLDCO AUTO: 41.7 %
HGB BLD-MCNC: 14.2 G/DL
IMM GRANULOCYTES # BLD AUTO: 0 K/UL (ref 0–0.08)
IMM GRANULOCYTES NFR BLD AUTO: 0 %
INR PPP: 1.1 INR
LYMPHOCYTES # BLD: 1.24 K/UL (ref 1.2–3.5)
LYMPHOCYTES NFR BLD: 26 %
MCH RBC QN AUTO: 30.3 PG (ref 28–33.2)
MCHC RBC AUTO-ENTMCNC: 34.1 G/DL (ref 32.2–35.5)
MCV RBC AUTO: 88.9 FL (ref 83–98)
MONOCYTES # BLD: 0.48 K/UL (ref 0.28–0.8)
MONOCYTES NFR BLD: 10.1 %
NEUTROPHILS # BLD: 2.95 K/UL (ref 1.7–7)
NEUTS SEG NFR BLD: 61.8 %
NRBC BLD-RTO: 0 %
PDW BLD AUTO: 9.6 FL (ref 9.4–12.3)
PLATELET # BLD AUTO: 138 K/UL
POTASSIUM SERPL-SCNC: 4.1 MEQ/L (ref 3.6–5.1)
PROT SERPL-MCNC: 7 G/DL (ref 6–8.2)
PROTHROMBIN TIME: 13.7 SEC (ref 12.2–14.5)
RBC # BLD AUTO: 4.69 M/UL (ref 3.93–5.22)
SODIUM SERPL-SCNC: 140 MEQ/L (ref 136–144)
WBC # BLD AUTO: 4.77 K/UL

## 2019-10-01 PROCEDURE — 85610 PROTHROMBIN TIME: CPT

## 2019-10-01 PROCEDURE — 82105 ALPHA-FETOPROTEIN SERUM: CPT

## 2019-10-01 PROCEDURE — 85025 COMPLETE CBC W/AUTO DIFF WBC: CPT

## 2019-10-01 PROCEDURE — 36415 COLL VENOUS BLD VENIPUNCTURE: CPT

## 2019-10-01 PROCEDURE — 85730 THROMBOPLASTIN TIME PARTIAL: CPT

## 2019-10-01 PROCEDURE — 82040 ASSAY OF SERUM ALBUMIN: CPT

## 2019-10-01 PROCEDURE — 76700 US EXAM ABDOM COMPLETE: CPT

## 2020-01-21 ENCOUNTER — TRANSCRIBE ORDERS (OUTPATIENT)
Dept: SCHEDULING | Age: 60
End: 2020-01-21

## 2020-01-21 DIAGNOSIS — K74.60 UNSPECIFIED CIRRHOSIS OF LIVER (CMS/HCC): Primary | ICD-10-CM

## 2020-04-06 ENCOUNTER — TRANSCRIBE ORDERS (OUTPATIENT)
Dept: LAB | Facility: HOSPITAL | Age: 60
End: 2020-04-06

## 2020-04-06 ENCOUNTER — APPOINTMENT (OUTPATIENT)
Dept: LAB | Facility: HOSPITAL | Age: 60
End: 2020-04-06
Attending: INTERNAL MEDICINE
Payer: COMMERCIAL

## 2020-04-06 DIAGNOSIS — K74.60 UNSPECIFIED CIRRHOSIS OF LIVER (CMS/HCC): Primary | ICD-10-CM

## 2020-04-06 DIAGNOSIS — R73.01 IMPAIRED FASTING GLUCOSE: Primary | ICD-10-CM

## 2020-04-06 DIAGNOSIS — E78.5 OTHER AND UNSPECIFIED HYPERLIPIDEMIA: ICD-10-CM

## 2020-04-06 DIAGNOSIS — I10 UNSPECIFIED ESSENTIAL HYPERTENSION: ICD-10-CM

## 2020-04-06 DIAGNOSIS — K74.60 UNSPECIFIED CIRRHOSIS OF LIVER (CMS/HCC): ICD-10-CM

## 2020-04-06 LAB
ALBUMIN SERPL-MCNC: 4.5 G/DL (ref 3.4–5)
ALP SERPL-CCNC: 82 IU/L (ref 35–126)
ALT SERPL-CCNC: 23 IU/L (ref 11–54)
ANION GAP SERPL CALC-SCNC: 9 MEQ/L (ref 3–15)
APTT PPP: 30 SEC (ref 23–35)
AST SERPL-CCNC: 23 IU/L (ref 15–41)
BASOPHILS # BLD: 0.03 K/UL (ref 0.01–0.1)
BASOPHILS NFR BLD: 0.6 %
BILIRUB SERPL-MCNC: 1.3 MG/DL (ref 0.3–1.2)
BUN SERPL-MCNC: 10 MG/DL (ref 8–20)
CALCIUM SERPL-MCNC: 9.5 MG/DL (ref 8.9–10.3)
CHLORIDE SERPL-SCNC: 102 MEQ/L (ref 98–109)
CO2 SERPL-SCNC: 26 MEQ/L (ref 22–32)
CREAT SERPL-MCNC: 0.6 MG/DL (ref 0.6–1.1)
DIFFERENTIAL METHOD BLD: ABNORMAL
EOSINOPHIL # BLD: 0.15 K/UL (ref 0.04–0.36)
EOSINOPHIL NFR BLD: 2.8 %
ERYTHROCYTE [DISTWIDTH] IN BLOOD BY AUTOMATED COUNT: 12 % (ref 11.7–14.4)
GFR SERPL CREATININE-BSD FRML MDRD: >60 ML/MIN/1.73M*2
GLUCOSE SERPL-MCNC: 104 MG/DL (ref 70–99)
HCT VFR BLDCO AUTO: 44.3 % (ref 35–45)
HGB BLD-MCNC: 14.8 G/DL (ref 11.8–15.7)
IMM GRANULOCYTES # BLD AUTO: 0.01 K/UL (ref 0–0.08)
IMM GRANULOCYTES NFR BLD AUTO: 0.2 %
INR PPP: 1 INR
LYMPHOCYTES # BLD: 1.42 K/UL (ref 1.2–3.5)
LYMPHOCYTES NFR BLD: 26.3 %
MCH RBC QN AUTO: 29.4 PG (ref 28–33.2)
MCHC RBC AUTO-ENTMCNC: 33.4 G/DL (ref 32.2–35.5)
MCV RBC AUTO: 88.1 FL (ref 83–98)
MONOCYTES # BLD: 0.51 K/UL (ref 0.28–0.8)
MONOCYTES NFR BLD: 9.5 %
NEUTROPHILS # BLD: 3.27 K/UL (ref 1.7–7)
NEUTS SEG NFR BLD: 60.6 %
NRBC BLD-RTO: 0 %
PDW BLD AUTO: 9.9 FL (ref 9.4–12.3)
PLATELET # BLD AUTO: 147 K/UL (ref 150–369)
POTASSIUM SERPL-SCNC: 4.3 MEQ/L (ref 3.6–5.1)
PROT SERPL-MCNC: 7.5 G/DL (ref 6–8.2)
PROTHROMBIN TIME: 13.4 SEC (ref 12.2–14.5)
RBC # BLD AUTO: 5.03 M/UL (ref 3.93–5.22)
SODIUM SERPL-SCNC: 137 MEQ/L (ref 136–144)
WBC # BLD AUTO: 5.39 K/UL (ref 3.8–10.5)

## 2020-04-06 PROCEDURE — 83036 HEMOGLOBIN GLYCOSYLATED A1C: CPT

## 2020-04-06 PROCEDURE — 80053 COMPREHEN METABOLIC PANEL: CPT

## 2020-04-06 PROCEDURE — 85610 PROTHROMBIN TIME: CPT

## 2020-04-06 PROCEDURE — 85025 COMPLETE CBC W/AUTO DIFF WBC: CPT

## 2020-04-06 PROCEDURE — 36415 COLL VENOUS BLD VENIPUNCTURE: CPT

## 2020-04-06 PROCEDURE — 84478 ASSAY OF TRIGLYCERIDES: CPT

## 2020-04-06 PROCEDURE — 85730 THROMBOPLASTIN TIME PARTIAL: CPT

## 2020-04-06 PROCEDURE — 84443 ASSAY THYROID STIM HORMONE: CPT

## 2020-04-07 LAB
CHOLEST SERPL-MCNC: 251 MG/DL
EST. AVERAGE GLUCOSE BLD GHB EST-MCNC: 108 MG/DL
HBA1C MFR BLD HPLC: 5.4 %
HDLC SERPL-MCNC: 48 MG/DL
HDLC SERPL: 5.2 {RATIO}
LDLC SERPL CALC-MCNC: 179 MG/DL
NONHDLC SERPL-MCNC: 203 MG/DL
TRIGL SERPL-MCNC: 121 MG/DL (ref 30–149)
TRIGL SERPL-MCNC: 122 MG/DL (ref 30–149)
TSH SERPL DL<=0.05 MIU/L-ACNC: 1.46 MIU/L (ref 0.34–5.6)

## 2020-04-28 ENCOUNTER — HOSPITAL ENCOUNTER (OUTPATIENT)
Dept: RADIOLOGY | Age: 60
Discharge: HOME | End: 2020-04-28
Attending: INTERNAL MEDICINE
Payer: COMMERCIAL

## 2020-04-28 DIAGNOSIS — K74.60 UNSPECIFIED CIRRHOSIS OF LIVER (CMS/HCC): ICD-10-CM

## 2020-04-28 RX ORDER — GADOBUTROL 604.72 MG/ML
7.5 INJECTION INTRAVENOUS ONCE
Status: COMPLETED | OUTPATIENT
Start: 2020-04-28 | End: 2020-04-28

## 2020-04-28 RX ADMIN — GADOBUTROL 7.5 ML: 604.72 INJECTION INTRAVENOUS at 10:45

## 2020-10-20 ENCOUNTER — TRANSCRIBE ORDERS (OUTPATIENT)
Dept: SCHEDULING | Age: 60
End: 2020-10-20

## 2020-10-20 DIAGNOSIS — K70.0 ALCOHOLIC FATTY LIVER: ICD-10-CM

## 2020-10-20 DIAGNOSIS — K76.6 PORTAL HYPERTENSION (CMS/HCC): ICD-10-CM

## 2020-10-20 DIAGNOSIS — K74.60 UNSPECIFIED CIRRHOSIS OF LIVER (CMS/HCC): Primary | ICD-10-CM

## 2020-10-20 DIAGNOSIS — R94.5 ABNORMAL RESULTS OF LIVER FUNCTION STUDIES: ICD-10-CM

## 2020-11-03 ENCOUNTER — APPOINTMENT (OUTPATIENT)
Dept: LAB | Facility: HOSPITAL | Age: 60
End: 2020-11-03
Attending: INTERNAL MEDICINE
Payer: COMMERCIAL

## 2020-11-03 ENCOUNTER — HOSPITAL ENCOUNTER (OUTPATIENT)
Dept: RADIOLOGY | Facility: HOSPITAL | Age: 60
Discharge: HOME | End: 2020-11-03
Attending: INTERNAL MEDICINE
Payer: COMMERCIAL

## 2020-11-03 ENCOUNTER — TRANSCRIBE ORDERS (OUTPATIENT)
Dept: LAB | Facility: HOSPITAL | Age: 60
End: 2020-11-03

## 2020-11-03 DIAGNOSIS — K74.60 UNSPECIFIED CIRRHOSIS OF LIVER (CMS/HCC): Primary | ICD-10-CM

## 2020-11-03 DIAGNOSIS — R94.5 ABNORMAL RESULTS OF LIVER FUNCTION STUDIES: ICD-10-CM

## 2020-11-03 DIAGNOSIS — E80.6 OTHER DISORDERS OF BILIRUBIN METABOLISM: ICD-10-CM

## 2020-11-03 DIAGNOSIS — K70.0 ALCOHOLIC FATTY LIVER: ICD-10-CM

## 2020-11-03 DIAGNOSIS — K74.60 UNSPECIFIED CIRRHOSIS OF LIVER (CMS/HCC): ICD-10-CM

## 2020-11-03 DIAGNOSIS — K76.6 PORTAL HYPERTENSION (CMS/HCC): ICD-10-CM

## 2020-11-03 LAB
AFP SERPL-MCNC: 2.8 NG/ML
ALBUMIN SERPL-MCNC: 4.7 G/DL (ref 3.4–5)
ALP SERPL-CCNC: 81 IU/L (ref 35–126)
ALT SERPL-CCNC: 21 IU/L (ref 11–54)
ANION GAP SERPL CALC-SCNC: 11 MEQ/L (ref 3–15)
APTT PPP: 29 SEC (ref 23–35)
AST SERPL-CCNC: 20 IU/L (ref 15–41)
BILIRUB SERPL-MCNC: 1.2 MG/DL (ref 0.3–1.2)
BUN SERPL-MCNC: 11 MG/DL (ref 8–20)
CALCIUM SERPL-MCNC: 9.8 MG/DL (ref 8.9–10.3)
CHLORIDE SERPL-SCNC: 102 MEQ/L (ref 98–109)
CO2 SERPL-SCNC: 28 MEQ/L (ref 22–32)
CREAT SERPL-MCNC: 0.6 MG/DL (ref 0.6–1.1)
GFR SERPL CREATININE-BSD FRML MDRD: >60 ML/MIN/1.73M*2
GLUCOSE SERPL-MCNC: 96 MG/DL (ref 70–99)
INR PPP: 1 INR
POTASSIUM SERPL-SCNC: 4.7 MEQ/L (ref 3.6–5.1)
PROT SERPL-MCNC: 7.9 G/DL (ref 6–8.2)
PROTHROMBIN TIME: 13.4 SEC (ref 12.2–14.5)
SODIUM SERPL-SCNC: 141 MEQ/L (ref 136–144)

## 2020-11-03 PROCEDURE — 80053 COMPREHEN METABOLIC PANEL: CPT

## 2020-11-03 PROCEDURE — 82105 ALPHA-FETOPROTEIN SERUM: CPT

## 2020-11-03 PROCEDURE — 85730 THROMBOPLASTIN TIME PARTIAL: CPT

## 2020-11-03 PROCEDURE — 85610 PROTHROMBIN TIME: CPT

## 2020-11-03 PROCEDURE — 76700 US EXAM ABDOM COMPLETE: CPT

## 2020-11-03 PROCEDURE — 36415 COLL VENOUS BLD VENIPUNCTURE: CPT

## 2020-12-26 ENCOUNTER — IMMUNIZATION (OUTPATIENT)
Dept: IMMUNIZATION | Facility: CLINIC | Age: 60
End: 2020-12-26

## 2021-01-16 ENCOUNTER — IMMUNIZATION (OUTPATIENT)
Dept: IMMUNIZATION | Facility: CLINIC | Age: 61
End: 2021-01-16

## 2021-03-08 ENCOUNTER — HOSPITAL ENCOUNTER (OUTPATIENT)
Dept: RADIOLOGY | Facility: HOSPITAL | Age: 61
Discharge: HOME | End: 2021-03-08
Attending: FAMILY MEDICINE
Payer: COMMERCIAL

## 2021-03-08 ENCOUNTER — TRANSCRIBE ORDERS (OUTPATIENT)
Dept: RADIOLOGY | Facility: HOSPITAL | Age: 61
End: 2021-03-08

## 2021-03-08 DIAGNOSIS — Z12.31 ENCOUNTER FOR SCREENING MAMMOGRAM FOR MALIGNANT NEOPLASM OF BREAST: ICD-10-CM

## 2021-03-08 DIAGNOSIS — Z12.31 ENCOUNTER FOR SCREENING MAMMOGRAM FOR MALIGNANT NEOPLASM OF BREAST: Primary | ICD-10-CM

## 2021-03-08 PROCEDURE — 77067 SCR MAMMO BI INCL CAD: CPT

## 2021-03-12 ENCOUNTER — APPOINTMENT (OUTPATIENT)
Dept: RADIOLOGY | Age: 61
End: 2021-03-12
Payer: COMMERCIAL

## 2021-06-18 ENCOUNTER — TRANSCRIBE ORDERS (OUTPATIENT)
Dept: SCHEDULING | Age: 61
End: 2021-06-18

## 2021-06-18 DIAGNOSIS — K72.90 HEPATIC FAILURE, UNSPECIFIED WITHOUT COMA (CMS/HCC): ICD-10-CM

## 2021-06-18 DIAGNOSIS — K70.30 ALCOHOLIC CIRRHOSIS OF LIVER WITHOUT ASCITES (CMS/HCC): Primary | ICD-10-CM

## 2021-06-18 DIAGNOSIS — K76.6 PORTAL HYPERTENSION (CMS/HCC): ICD-10-CM

## 2021-07-19 ENCOUNTER — HOSPITAL ENCOUNTER (OUTPATIENT)
Dept: RADIOLOGY | Age: 61
Discharge: HOME | End: 2021-07-19
Attending: INTERNAL MEDICINE
Payer: COMMERCIAL

## 2021-07-19 DIAGNOSIS — K70.30 ALCOHOLIC CIRRHOSIS OF LIVER WITHOUT ASCITES (CMS/HCC): ICD-10-CM

## 2021-07-19 DIAGNOSIS — K72.90 HEPATIC FAILURE, UNSPECIFIED WITHOUT COMA (CMS/HCC): ICD-10-CM

## 2021-07-19 DIAGNOSIS — K76.6 PORTAL HYPERTENSION (CMS/HCC): ICD-10-CM

## 2021-07-19 RX ORDER — GADOBUTROL 604.72 MG/ML
6.5 INJECTION INTRAVENOUS ONCE
Status: COMPLETED | OUTPATIENT
Start: 2021-07-19 | End: 2021-07-19

## 2021-07-19 RX ADMIN — GADOBUTROL 6.5 ML: 604.72 INJECTION INTRAVENOUS at 08:09

## 2021-09-29 ENCOUNTER — HOSPITAL ENCOUNTER (OUTPATIENT)
Facility: CLINIC | Age: 61
Discharge: HOME | End: 2021-09-29
Attending: FAMILY MEDICINE
Payer: COMMERCIAL

## 2021-09-29 VITALS
OXYGEN SATURATION: 96 % | DIASTOLIC BLOOD PRESSURE: 70 MMHG | TEMPERATURE: 98.7 F | HEART RATE: 50 BPM | SYSTOLIC BLOOD PRESSURE: 154 MMHG | RESPIRATION RATE: 14 BRPM

## 2021-09-29 DIAGNOSIS — J01.90 ACUTE SINUSITIS, RECURRENCE NOT SPECIFIED, UNSPECIFIED LOCATION: ICD-10-CM

## 2021-09-29 DIAGNOSIS — J06.9 UPPER RESPIRATORY TRACT INFECTION, UNSPECIFIED TYPE: Primary | ICD-10-CM

## 2021-09-29 PROCEDURE — 99202 OFFICE O/P NEW SF 15 MIN: CPT | Performed by: FAMILY MEDICINE

## 2021-09-29 PROCEDURE — S9083 URGENT CARE CENTER GLOBAL: HCPCS | Performed by: FAMILY MEDICINE

## 2021-09-29 RX ORDER — SULFAMETHOXAZOLE AND TRIMETHOPRIM 800; 160 MG/1; MG/1
1 TABLET ORAL 2 TIMES DAILY
Qty: 14 TABLET | Refills: 0 | Status: SHIPPED | OUTPATIENT
Start: 2021-09-29 | End: 2021-09-29 | Stop reason: CLARIF

## 2021-09-29 RX ORDER — AMOXICILLIN 875 MG/1
875 TABLET, FILM COATED ORAL 2 TIMES DAILY
Qty: 20 TABLET | Refills: 0 | Status: SHIPPED | OUTPATIENT
Start: 2021-09-29 | End: 2021-10-09

## 2021-09-29 ASSESSMENT — ENCOUNTER SYMPTOMS
NAUSEA: 0
SHORTNESS OF BREATH: 0
COUGH: 1
SNORING: 0
CHILLS: 1
WHEEZING: 0
FEVER: 0
VOMITING: 0
RHINORRHEA: 1
HOARSE VOICE: 0
HEADACHES: 0
FATIGUE: 1

## 2021-09-29 NOTE — DISCHARGE INSTRUCTIONS
Recommending self-quarantine until COVID results return.   We will contact you with your lab results once they return.     For pain and/or fever:     Tylenol 500mg-650mg doses every 6hrs        If your symptoms persist or worsen, we recommend seeking care in the Emergency department for further evaluation and treatment.   __________________________________________________________  Please be aware, start to finish, a viral illness can last 14-21 days. If your symptoms are not improving in this time, please return for further evaluation. And remember, antibiotics do not treat viruses.     **REMEMBER** Treat the PROBLEM (mucous) and your SYMPTOMS (cough, sore throat, congestion) will improve! The best remedy for mucous isn’t OTC medications, it’s saline (salt water; gargling AND sinus rinsing)!     I personally recommend NeilMed All-In-One (find this on [employer]), every 3-4hrs, 30 mins before bed and first thing in the AM. If you choose to use a nasal rinse that you must prepare, please only use bottled/filtered water. You may also be interested in Alkalol Mucous Natural Mucous Solvent, you may find this on Amazon as well.     Salt Water gargles- as often as needed throughout the day.     Proper hand hygiene.     You may also use Delsym cough medicine (for patients 4yrs and older)  and Mucinex 12hr (Drink Plenty of water to active it!). These will  help break up that mucous and help you to get it out!    Oil Diffuser with essential oils such as lavender, camphor, eucalyptus, theives. Thieves oil is very helpful! A few drops in an oil diffuser can provide significant relief!   __________________________________________________________   You have been prescribed an antibiotic, only fill and take if symptoms progress/worsen.     Once started, please complete full course of antibiotic to prevent creating resistance within the bugs causing your illness.       Please eat a daily yogurt or take a daily Probiotic for GI health  while taking antibiotics.  Recommending the probiotic Digestive Advantage (safe for patients 3yrs and older).    Remember to take the Probiotic *no sooner* than 4 hours *after* taking your antibiotic.  If you take the probiotic sooner than that, the good bacteria will likely be killed by the antibiotic.   __________________________________________________________  Please let us know about your experience today!   Your input is truly appreciated and helps Dr. Bradshaw and your team at [facility] to continue to provide a superior level of service!   Get Well Soon!

## 2021-09-29 NOTE — ED PROVIDER NOTES
History  Chief Complaint   Patient presents with   • Nasal Congestion     chills runny nose fatigue started today     Slight cough, subjective chills, chest tightness, fatigue, all beginning this AM.   Denies difficulty breathing, objective fever, chills, loss of taste or smell, N/V/D. Denies exposure to COVID+ individuals  Fully COVID Vaccinated, Pfizer second dose in Jan 2021          History provided by:  Patient   used: No    Sinusitis  Pain details:     Quality:  Pressure    Severity:  Mild    Duration:  1 day    Timing:  Constant  Duration:  1 day  Chronicity:  New  Context: not allergies, not chemical odor and not recent URI    Relieved by:  Nothing  Worsened by:  Nothing  Ineffective treatments:  None tried  Associated symptoms: chills, congestion, cough, fatigue and rhinorrhea    Associated symptoms: no ear pain, no fever, no headaches, no hoarse voice, no mouth breathing, no nausea, no shortness of breath, no sneezing, no snoring, no vomiting and no wheezing        Past Medical History:   Diagnosis Date   • Anxiety        History reviewed. No pertinent surgical history.    History reviewed. No pertinent family history.    Social History     Tobacco Use   • Smoking status: Never Smoker   • Smokeless tobacco: Never Used   Substance Use Topics   • Alcohol use: No   • Drug use: No       Review of Systems   Constitutional: Positive for chills and fatigue. Negative for fever.   HENT: Positive for congestion and rhinorrhea. Negative for ear pain, hoarse voice and sneezing.    Respiratory: Positive for cough. Negative for snoring, shortness of breath and wheezing.    Gastrointestinal: Negative for nausea and vomiting.   Neurological: Negative for headaches.       Physical Exam  ED Triage Vitals [09/29/21 1732]   Temp Heart Rate Resp BP SpO2   37.1 °C (98.7 °F) (!) 50 14 (!) 154/70 96 %      Temp Source Heart Rate Source Patient Position BP Location FiO2 (%) (Set)   Oral Monitor Sitting Left  upper arm --       Physical Exam  Constitutional:       General: She is not in acute distress.     Appearance: Normal appearance. She is not ill-appearing.   HENT:      Head: Normocephalic.      Right Ear: Tympanic membrane, ear canal and external ear normal. There is no impacted cerumen.      Left Ear: Tympanic membrane, ear canal and external ear normal. There is no impacted cerumen.      Ears:      Comments: Clear effusion posterior to b/l TMs.       Nose: Congestion and rhinorrhea present.      Mouth/Throat:      Mouth: Mucous membranes are moist.      Pharynx: No oropharyngeal exudate or posterior oropharyngeal erythema.      Comments: PND    Eyes:      Extraocular Movements: Extraocular movements intact.      Conjunctiva/sclera: Conjunctivae normal.   Cardiovascular:      Rate and Rhythm: Normal rate and regular rhythm.      Pulses: Normal pulses.      Heart sounds: Normal heart sounds. No murmur heard.  No friction rub. No gallop.    Pulmonary:      Effort: Pulmonary effort is normal. No respiratory distress.      Breath sounds: No wheezing, rhonchi or rales.   Musculoskeletal:         General: Normal range of motion.      Cervical back: Normal range of motion.   Lymphadenopathy:      Cervical: No cervical adenopathy.   Skin:     General: Skin is warm.      Capillary Refill: Capillary refill takes less than 2 seconds.   Neurological:      General: No focal deficit present.      Mental Status: She is alert and oriented to person, place, and time.           Procedures  Procedures    UC Course  Clinical Impressions as of 09/29/21 1925   Upper respiratory tract infection, unspecified type   Acute sinusitis, recurrence not specified, unspecified location       MDM  Number of Diagnoses or Management Options  Acute sinusitis, recurrence not specified, unspecified location  Upper respiratory tract infection, unspecified type  Diagnosis management comments: Slight cough, subjective chills, chest tightness, fatigue,  all beginning this AM.   Denies difficulty breathing, objective fever, chills, loss of taste or smell, N/V/D. Denies exposure to COVID+ individuals  Fully COVID Vaccinated, Pfizer second dose in Jan 2021  Exam shows PND, Clear effusion posterior to b/l TMs, boggy turbinates and congestion. Otherwise unremarkable.   Helpful URI remedies provided, Recommending Nasal saline, Salt Water gargle  Sinusitis, likely allergic, low suspicion/concern for COVID, COVID swab sent.   Recommending self-quarantine until COVID results return.   For pain and/or fever:     Tylenol 500mg-650mg doses every 6hrs      Recommending seeking care in the Emergency department for further evaluation and treatment if symptoms worsen.   Helpful sinusitis recommendations and tips provided on discharge.   Recommending Nasal saline, Salt Water gargle. Per Yasmeen's insistence, Oral Antibiotic prescription printed as prn. The patient verbally understands the signs & symptoms to monitor to warrant starting oral antibiotic.   Recommending a daily yogurt or Probiotic for GI health while taking antibiotics.  To take the Probiotic *no sooner* than 4 hours *after* taking the antibiotic to prevent the good bacteria from being killed by the antibiotic.   Urged to seek immediate medical treatment should symptoms worsen.      Risk of Complications, Morbidity, and/or Mortality  Presenting problems: low  Diagnostic procedures: low  Management options: low    Critical Care  Total time providing critical care: < 30 minutes    Patient Progress  Patient progress: stable                 Teodoro Bradshaw DO  09/29/21 1928

## 2021-09-29 NOTE — Clinical Note
Yasmeen Linares was seen and treated in our urgent care on 9/29/2021.  She may return to work on 10/01/2021.       If you have any questions or concerns, please don't hesitate to call.      Teodoro Bradshaw, DO

## 2022-02-07 ENCOUNTER — TRANSCRIBE ORDERS (OUTPATIENT)
Dept: SCHEDULING | Age: 62
End: 2022-02-07

## 2022-02-07 DIAGNOSIS — Z11.59 ENCOUNTER FOR SCREENING FOR OTHER VIRAL DISEASES: Primary | ICD-10-CM

## 2022-02-10 ENCOUNTER — APPOINTMENT (OUTPATIENT)
Dept: LAB | Facility: HOSPITAL | Age: 62
End: 2022-02-10
Attending: INTERNAL MEDICINE
Payer: COMMERCIAL

## 2022-02-10 DIAGNOSIS — Z11.59 ENCOUNTER FOR SCREENING FOR OTHER VIRAL DISEASES: ICD-10-CM

## 2022-02-10 LAB — SARS-COV-2 RNA RESP QL NAA+PROBE: NEGATIVE

## 2022-02-10 PROCEDURE — U0003 INFECTIOUS AGENT DETECTION BY NUCLEIC ACID (DNA OR RNA); SEVERE ACUTE RESPIRATORY SYNDROME CORONAVIRUS 2 (SARS-COV-2) (CORONAVIRUS DISEASE [COVID-19]), AMPLIFIED PROBE TECHNIQUE, MAKING USE OF HIGH THROUGHPUT TECHNOLOGIES AS DESCRIBED BY CMS-2020-01-R: HCPCS

## 2022-02-10 PROCEDURE — C9803 HOPD COVID-19 SPEC COLLECT: HCPCS

## 2022-02-14 ENCOUNTER — HOSPITAL ENCOUNTER (OUTPATIENT)
Facility: HOSPITAL | Age: 62
Discharge: HOME | End: 2022-02-14
Attending: INTERNAL MEDICINE | Admitting: INTERNAL MEDICINE
Payer: COMMERCIAL

## 2022-02-14 ENCOUNTER — ANESTHESIA EVENT (OUTPATIENT)
Dept: ENDOSCOPY | Facility: HOSPITAL | Age: 62
End: 2022-02-14
Payer: COMMERCIAL

## 2022-02-14 ENCOUNTER — ANESTHESIA (OUTPATIENT)
Dept: ENDOSCOPY | Facility: HOSPITAL | Age: 62
End: 2022-02-14
Payer: COMMERCIAL

## 2022-02-14 VITALS
HEART RATE: 58 BPM | WEIGHT: 153 LBS | SYSTOLIC BLOOD PRESSURE: 124 MMHG | HEIGHT: 63 IN | BODY MASS INDEX: 27.11 KG/M2 | RESPIRATION RATE: 16 BRPM | DIASTOLIC BLOOD PRESSURE: 72 MMHG | OXYGEN SATURATION: 99 % | TEMPERATURE: 97.2 F

## 2022-02-14 DIAGNOSIS — Z86.0100 PERSONAL HISTORY OF COLONIC POLYPS: ICD-10-CM

## 2022-02-14 PROCEDURE — 63600000 HC DRUGS/DETAIL CODE: Performed by: NURSE ANESTHETIST, CERTIFIED REGISTERED

## 2022-02-14 PROCEDURE — 71000011 HC PACU PHASE 1 EA ADDL MIN: Performed by: INTERNAL MEDICINE

## 2022-02-14 PROCEDURE — 88305 TISSUE EXAM BY PATHOLOGIST: CPT | Performed by: INTERNAL MEDICINE

## 2022-02-14 PROCEDURE — 71000001 HC PACU PHASE 1 INITIAL 30MIN: Performed by: INTERNAL MEDICINE

## 2022-02-14 PROCEDURE — 0DBL8ZZ EXCISION OF TRANSVERSE COLON, VIA NATURAL OR ARTIFICIAL OPENING ENDOSCOPIC: ICD-10-PCS | Performed by: INTERNAL MEDICINE

## 2022-02-14 PROCEDURE — 75000011 HC COLONSCOPY BIOPSY: Performed by: INTERNAL MEDICINE

## 2022-02-14 PROCEDURE — 25800000 HC PHARMACY IV SOLUTIONS: Performed by: NURSE ANESTHETIST, CERTIFIED REGISTERED

## 2022-02-14 PROCEDURE — 0DBP8ZZ EXCISION OF RECTUM, VIA NATURAL OR ARTIFICIAL OPENING ENDOSCOPIC: ICD-10-PCS | Performed by: INTERNAL MEDICINE

## 2022-02-14 PROCEDURE — 37000001 HC ANESTHESIA GENERAL: Performed by: INTERNAL MEDICINE

## 2022-02-14 RX ORDER — PROPOFOL 10 MG/ML
INJECTION, EMULSION INTRAVENOUS CONTINUOUS PRN
Status: DISCONTINUED | OUTPATIENT
Start: 2022-02-14 | End: 2022-02-14 | Stop reason: SURG

## 2022-02-14 RX ORDER — SODIUM CHLORIDE 9 MG/ML
100 INJECTION, SOLUTION INTRAVENOUS CONTINUOUS
Status: DISCONTINUED | OUTPATIENT
Start: 2022-02-14 | End: 2022-02-14 | Stop reason: HOSPADM

## 2022-02-14 RX ORDER — PROPOFOL 10 MG/ML
INJECTION, EMULSION INTRAVENOUS AS NEEDED
Status: DISCONTINUED | OUTPATIENT
Start: 2022-02-14 | End: 2022-02-14 | Stop reason: SURG

## 2022-02-14 RX ORDER — SODIUM CHLORIDE 9 MG/ML
INJECTION, SOLUTION INTRAVENOUS CONTINUOUS PRN
Status: DISCONTINUED | OUTPATIENT
Start: 2022-02-14 | End: 2022-02-14 | Stop reason: SURG

## 2022-02-14 RX ADMIN — SODIUM CHLORIDE: 9 INJECTION, SOLUTION INTRAVENOUS at 08:58

## 2022-02-14 RX ADMIN — PROPOFOL INJECTABLE EMULSION 100 MG: 10 INJECTION, EMULSION INTRAVENOUS at 09:02

## 2022-02-14 RX ADMIN — PROPOFOL 150 MCG/KG/MIN: 10 INJECTION, EMULSION INTRAVENOUS at 09:02

## 2022-02-14 NOTE — ANESTHESIA POSTPROCEDURE EVALUATION
Patient: Yasmeen Linares    Procedure Summary     Date: 02/14/22 Room / Location: [facility] 4 (D) / [facility]    Anesthesia Start: 0858 Anesthesia Stop: 0928    Procedure: AZ COLONOSCOPY W/BIOPSY SINGLE/MULTIPLE [71351 (CPT®)] (N/A Anus) Diagnosis:       Personal history of colonic polyps      (Personal history of colonic polyps [Z86.010])    Providers: Lydia Marin MD Responsible Provider: Jania Fraga MD    Anesthesia Type: general ASA Status: 3          Anesthesia Type: general  PACU Vitals    No data found in the last 10 encounters.           Anesthesia Post Evaluation    Pain management: adequate  Patient location during evaluation: PACU  Patient participation: complete - patient participated  Level of consciousness: awake and alert  Cardiovascular status: acceptable  Airway Patency: adequate  Respiratory status: acceptable  Hydration status: acceptable  Anesthetic complications: no

## 2022-02-14 NOTE — OP NOTE
_______________________________________________________________________________  Patient Name: Yasmeen Linares           Procedure Date: 2/14/2022 8:53 AM  MRN: [id number]                     Account Number: [id number]  YOB: 1960              Age: 61  Gender: Female                        Note Status: Finalized  Attending MD: BHUPENDRA LUI  _______________________________________________________________________________  Procedure:             Colonoscopy  Indications:           Screening for colorectal malignant neoplasm  Providers:             BHUPENDRA LUI (Doctor)  Referring MD:          MICHELET ALLAN  Requesting Provider:  Medicines:             See the Anesthesia note for documentation of the  administered medications  Complications:         No immediate complications.  _______________________________________________________________________________  Procedure:             After I obtained informed consent, the scope was  passed under direct vision. Throughout the procedure,  the patient's blood pressure, pulse, and oxygen  saturations were monitored continuously. The  colonoscope was introduced through the anus and  advanced to the cecum, identified by appendiceal  orifice and ileocecal valve. The colonoscopy was  performed without difficulty. The patient tolerated  the procedure well. The quality of the bowel  preparation was good. The ileocecal valve, appendiceal  orifice, and rectum were photographed.  Findings:  The perianal and digital rectal examinations were normal.  Five sessile polyps were found in the rectum. The polyps were 3 mm in  size. These polyps were removed with a cold biopsy forceps. Resection  and retrieval were complete. Verification of patient identification for  the specimen was done. Estimated blood loss was minimal.  A 3 mm polyp was found in the transverse colon. The polyp was sessile.  The polyp was removed with a cold biopsy forceps.  Resection and  retrieval were complete. Verification of patient identification for the  specimen was done. Estimated blood loss was minimal.  The exam was otherwise without abnormality on direct and retroflexion  views.  Impression:            - Five 3 mm polyps in the rectum, removed with a cold  biopsy forceps. Resected and retrieved.  - One 3 mm polyp in the transverse colon, removed with  a cold biopsy forceps. Resected and retrieved.  - The examination was otherwise normal on direct and  retroflexion views.  Recommendation:        - Patient has a contact number available for  emergencies. The signs and symptoms of potential  delayed complications were discussed with the patient.  Return to normal activities tomorrow. Written  discharge instructions were provided to the patient.  - Resume previous diet.  - Continue present medications.  - Await pathology results.  - Repeat colonoscopy in 3 - 5 years for surveillance  based on pathology results.  Procedure Code(s):     --- Professional ---  46257, Colonoscopy, flexible; with biopsy, single or  multiple  Diagnosis Code(s):     --- Professional ---  Z12.11, Encounter for screening for malignant neoplasm  of colon  K62.1, Rectal polyp  K63.5, Polyp of colon  CPT copyright 2020 [employer]. All rights reserved.  The codes documented in this report are preliminary and upon  review may  be revised to meet current compliance requirements.  Attending Participation:  I personally performed the entire procedure.  Lydia Fleming MD  ______________________  LYDIA FLEMING MD~BERNADETTE  2/14/2022 9:27:18 AM  This report has been signed electronically.  Number of Addenda: 0  Note Initiated On: 2/14/2022 8:53 AM

## 2022-02-14 NOTE — ANESTHESIA PREPROCEDURE EVALUATION
Relevant Problems   No relevant active problems       Anesthesia ROS/MED HX    Anesthesia History    Previous anesthetics  Pulmonary - neg  Neuro/Psych    Substance abuse (etoh)  Cardiovascular- neg   Covid19 Test Reviewed  Hematological - neg  GI/Hepatic   liver disease and cirrhosis  Musculoskeletal- neg  Renal Disease- neg  Endo/Other- neg       History reviewed. No pertinent surgical history.    Physical Exam    Airway   Mallampati: II   TM distance: >3 FB   Neck ROM: full  Cardiovascular - normal   Rhythm: regular   Rate: normalPulmonary - normal   clear to auscultation  Dental - normal        Anesthesia Plan    Plan: general    Technique: total IV anesthesia     Airway: natural airway / supplemental oxygen   ASA 3  Postop Plan:   Patient Disposition: phase II then home

## 2022-02-14 NOTE — H&P
"   GI H/P - Pre Procedure          Subjective   Yasmeen Linares is a 61 y.o. female who was admitted for Personal history of colonic polyps [Z86.010]. She denies any abdominal pain, changes in bowel habits, rectal bleeding, unintentional weight loss. She has no FH of CRC.         Past Medical History:   Diagnosis Date   • Anxiety    • Cirrhosis (CMS/HCC)    • Esophageal varices (CMS/HCC)      History reviewed. No pertinent surgical history.  Allergies   Allergen Reactions   • Cefzil [Cefprozil] Shortness of breath   • Biaxin [Clarithromycin] Other (see comments)     Rapid heartbeat       Home Medications:  •  gabapentin, Take 100 mg by mouth daily.  •  XIFAXAN,       Social History     Socioeconomic History   • Marital status: Single     Spouse name: None   • Number of children: None   • Years of education: None   • Highest education level: None   Occupational History   • None   Tobacco Use   • Smoking status: Never Smoker   • Smokeless tobacco: Never Used   Substance and Sexual Activity   • Alcohol use: No   • Drug use: No   • Sexual activity: None   Other Topics Concern   • None   Social History Narrative   • None     Social Determinants of Health     Financial Resource Strain: Not on file   Food Insecurity: Not on file   Transportation Needs: Not on file   Physical Activity: Not on file   Stress: Not on file   Social Connections: Not on file   Intimate Partner Violence: Not on file   Housing Stability: Not on file         History reviewed. No pertinent family history.  As per HPI.     ROS: remaining 10 point review of systems negative.       Physical Exam  Visit Vitals  /75   Pulse 79   Temp 36.5 °C (97.7 °F) (Temporal)   Resp 18   Ht 1.6 m (5' 3\")   Wt 69.4 kg (153 lb)   BMI 27.10 kg/m²       General appearance: no acute distress, alert, talkative, interactive  Head: normocephalic  Lungs: clear to auscultation anteriorly   Heart: regular    Abdomen: soft, non-tender; bowel sounds normal. " Non-distended  Neurologic: non-focal  Extremities: no significant edema, warm        Assessment       H/o polyps     Plan    Screening colonoscopy      Lydia Marin MD  2/14/2022

## 2022-02-14 NOTE — ANESTHESIOLOGIST PRE-PROCEDURE ATTESTATION
Pre-Procedure Patient Identification:  I am the Primary Anesthesiologist and have identified the patient on 02/14/22 at 8:52 AM.   I have confirmed the procedure(s) will be performed by the following surgeon/proceduralist Lydia Marin MD.

## 2022-02-15 LAB
CASE RPRT: NORMAL
CLINICAL INFO: NORMAL
PATH REPORT.FINAL DX SPEC: NORMAL
PATH REPORT.GROSS SPEC: NORMAL

## 2022-03-23 ENCOUNTER — TRANSCRIBE ORDERS (OUTPATIENT)
Dept: RADIOLOGY | Facility: HOSPITAL | Age: 62
End: 2022-03-23

## 2022-03-23 ENCOUNTER — HOSPITAL ENCOUNTER (OUTPATIENT)
Dept: RADIOLOGY | Facility: HOSPITAL | Age: 62
Discharge: HOME | End: 2022-03-23
Attending: PHYSICIAN ASSISTANT
Payer: COMMERCIAL

## 2022-03-23 DIAGNOSIS — Z12.31 ENCOUNTER FOR SCREENING MAMMOGRAM FOR MALIGNANT NEOPLASM OF BREAST: Primary | ICD-10-CM

## 2022-03-23 DIAGNOSIS — Z12.31 ENCOUNTER FOR SCREENING MAMMOGRAM FOR MALIGNANT NEOPLASM OF BREAST: ICD-10-CM

## 2022-03-23 PROCEDURE — 77063 BREAST TOMOSYNTHESIS BI: CPT

## 2022-03-23 PROCEDURE — 77067 SCR MAMMO BI INCL CAD: CPT

## 2022-05-16 ENCOUNTER — APPOINTMENT (OUTPATIENT)
Dept: LAB | Facility: HOSPITAL | Age: 62
End: 2022-05-16
Attending: PHYSICIAN ASSISTANT
Payer: COMMERCIAL

## 2022-05-16 ENCOUNTER — TRANSCRIBE ORDERS (OUTPATIENT)
Dept: LAB | Facility: HOSPITAL | Age: 62
End: 2022-05-16

## 2022-05-16 DIAGNOSIS — K70.30 ALCOHOLIC CIRRHOSIS OF LIVER WITHOUT ASCITES (CMS/HCC): ICD-10-CM

## 2022-05-16 DIAGNOSIS — K70.30 ALCOHOLIC CIRRHOSIS OF LIVER WITHOUT ASCITES (CMS/HCC): Primary | ICD-10-CM

## 2022-05-16 DIAGNOSIS — Z00.01 ENCOUNTER FOR GENERAL ADULT MEDICAL EXAMINATION WITH ABNORMAL FINDINGS: ICD-10-CM

## 2022-05-16 DIAGNOSIS — Z78.9 AMERICAN DIABETES ASSOCIATION (ADA) 1100 CALORIE DIET: ICD-10-CM

## 2022-05-16 DIAGNOSIS — R73.01 IMPAIRED FASTING GLUCOSE: ICD-10-CM

## 2022-05-16 DIAGNOSIS — E03.9 HYPOTHYROIDISM, UNSPECIFIED: ICD-10-CM

## 2022-05-16 DIAGNOSIS — Z00.01 ENCOUNTER FOR GENERAL ADULT MEDICAL EXAMINATION WITH ABNORMAL FINDINGS: Primary | ICD-10-CM

## 2022-05-16 LAB
ALBUMIN SERPL-MCNC: 4.3 G/DL (ref 3.4–5)
ALP SERPL-CCNC: 72 IU/L (ref 35–126)
ALT SERPL-CCNC: 20 IU/L (ref 11–54)
ANION GAP SERPL CALC-SCNC: 12 MEQ/L (ref 3–15)
APTT PPP: 27 SEC (ref 23–35)
AST SERPL-CCNC: 24 IU/L (ref 15–41)
BASOPHILS # BLD: 0.05 K/UL (ref 0.01–0.1)
BASOPHILS NFR BLD: 1 %
BILIRUB SERPL-MCNC: 1.2 MG/DL (ref 0.3–1.2)
BILIRUB UR QL STRIP.AUTO: NEGATIVE MG/DL
BUN SERPL-MCNC: 12 MG/DL (ref 8–20)
CALCIUM SERPL-MCNC: 9.4 MG/DL (ref 8.9–10.3)
CHLORIDE SERPL-SCNC: 104 MEQ/L (ref 98–109)
CHOLEST SERPL-MCNC: 262 MG/DL
CLARITY UR REFRACT.AUTO: CLEAR
CO2 SERPL-SCNC: 25 MEQ/L (ref 22–32)
COLOR UR AUTO: YELLOW
CREAT SERPL-MCNC: 0.7 MG/DL (ref 0.6–1.1)
DIFFERENTIAL METHOD BLD: NORMAL
EOSINOPHIL # BLD: 0.13 K/UL (ref 0.04–0.36)
EOSINOPHIL NFR BLD: 2.6 %
ERYTHROCYTE [DISTWIDTH] IN BLOOD BY AUTOMATED COUNT: 12 % (ref 11.7–14.4)
EST. AVERAGE GLUCOSE BLD GHB EST-MCNC: 105 MG/DL
GFR SERPL CREATININE-BSD FRML MDRD: >60 ML/MIN/1.73M*2
GLUCOSE SERPL-MCNC: 108 MG/DL (ref 70–99)
GLUCOSE UR STRIP.AUTO-MCNC: NEGATIVE MG/DL
HBA1C MFR BLD HPLC: 5.3 %
HCT VFR BLDCO AUTO: 41.5 % (ref 35–45)
HDLC SERPL-MCNC: 59 MG/DL
HDLC SERPL: 4.4 {RATIO}
HGB BLD-MCNC: 14.4 G/DL (ref 11.8–15.7)
HGB UR QL STRIP.AUTO: NEGATIVE
IMM GRANULOCYTES # BLD AUTO: 0.01 K/UL (ref 0–0.08)
IMM GRANULOCYTES NFR BLD AUTO: 0.2 %
INR PPP: 1
KETONES UR STRIP.AUTO-MCNC: NEGATIVE MG/DL
LDLC SERPL CALC-MCNC: 183 MG/DL
LEUKOCYTE ESTERASE UR QL STRIP.AUTO: NEGATIVE
LYMPHOCYTES # BLD: 1.22 K/UL (ref 1.2–3.5)
LYMPHOCYTES NFR BLD: 24.5 %
MCH RBC QN AUTO: 29.8 PG (ref 28–33.2)
MCHC RBC AUTO-ENTMCNC: 34.7 G/DL (ref 32.2–35.5)
MCV RBC AUTO: 85.9 FL (ref 83–98)
MONOCYTES # BLD: 0.46 K/UL (ref 0.28–0.8)
MONOCYTES NFR BLD: 9.3 %
NEUTROPHILS # BLD: 3.1 K/UL (ref 1.7–7)
NEUTS SEG NFR BLD: 62.4 %
NITRITE UR QL STRIP.AUTO: NEGATIVE
NONHDLC SERPL-MCNC: 203 MG/DL
NRBC BLD-RTO: 0 %
PDW BLD AUTO: 10.2 FL (ref 9.4–12.3)
PH UR STRIP.AUTO: 6.5 [PH]
PLATELET # BLD AUTO: 176 K/UL (ref 150–369)
POTASSIUM SERPL-SCNC: 4.4 MEQ/L (ref 3.6–5.1)
PROT SERPL-MCNC: 7 G/DL (ref 6–8.2)
PROT UR QL STRIP.AUTO: NEGATIVE
PROTHROMBIN TIME: 13 SEC (ref 12.2–14.5)
RBC # BLD AUTO: 4.83 M/UL (ref 3.93–5.22)
SODIUM SERPL-SCNC: 141 MEQ/L (ref 136–144)
SP GR UR REFRACT.AUTO: 1.01
TRIGL SERPL-MCNC: 100 MG/DL (ref 30–149)
TSH SERPL DL<=0.05 MIU/L-ACNC: 1.66 MIU/L (ref 0.34–5.6)
UROBILINOGEN UR STRIP-ACNC: 0.2 EU/DL
WBC # BLD AUTO: 4.97 K/UL (ref 3.8–10.5)

## 2022-05-16 PROCEDURE — 84443 ASSAY THYROID STIM HORMONE: CPT

## 2022-05-16 PROCEDURE — 80061 LIPID PANEL: CPT

## 2022-05-16 PROCEDURE — 80053 COMPREHEN METABOLIC PANEL: CPT

## 2022-05-16 PROCEDURE — 85025 COMPLETE CBC W/AUTO DIFF WBC: CPT

## 2022-05-16 PROCEDURE — 85730 THROMBOPLASTIN TIME PARTIAL: CPT

## 2022-05-16 PROCEDURE — 81003 URINALYSIS AUTO W/O SCOPE: CPT

## 2022-05-16 PROCEDURE — 83036 HEMOGLOBIN GLYCOSYLATED A1C: CPT

## 2022-05-16 PROCEDURE — 36415 COLL VENOUS BLD VENIPUNCTURE: CPT

## 2022-05-16 PROCEDURE — 85610 PROTHROMBIN TIME: CPT

## 2022-05-24 ENCOUNTER — TRANSCRIBE ORDERS (OUTPATIENT)
Dept: SCHEDULING | Age: 62
End: 2022-05-24

## 2022-05-24 DIAGNOSIS — K70.30 ALCOHOLIC CIRRHOSIS OF LIVER WITHOUT ASCITES (CMS/HCC): Primary | ICD-10-CM

## 2022-08-01 ENCOUNTER — HOSPITAL ENCOUNTER (OUTPATIENT)
Dept: RADIOLOGY | Facility: HOSPITAL | Age: 62
Discharge: HOME | End: 2022-08-01
Attending: PHYSICIAN ASSISTANT
Payer: COMMERCIAL

## 2022-08-01 DIAGNOSIS — K70.30 ALCOHOLIC CIRRHOSIS OF LIVER WITHOUT ASCITES (CMS/HCC): ICD-10-CM

## 2022-08-01 PROCEDURE — 76700 US EXAM ABDOM COMPLETE: CPT

## 2022-12-12 ENCOUNTER — HOSPITAL ENCOUNTER (OUTPATIENT)
Facility: CLINIC | Age: 62
Discharge: HOME | End: 2022-12-12
Attending: FAMILY MEDICINE
Payer: COMMERCIAL

## 2022-12-12 VITALS
DIASTOLIC BLOOD PRESSURE: 70 MMHG | OXYGEN SATURATION: 97 % | SYSTOLIC BLOOD PRESSURE: 153 MMHG | RESPIRATION RATE: 16 BRPM | TEMPERATURE: 98.2 F | HEART RATE: 68 BPM

## 2022-12-12 DIAGNOSIS — T78.40XA ALLERGIC REACTION, INITIAL ENCOUNTER: Primary | ICD-10-CM

## 2022-12-12 PROCEDURE — S9083 URGENT CARE CENTER GLOBAL: HCPCS | Performed by: NURSE PRACTITIONER

## 2022-12-12 PROCEDURE — 99213 OFFICE O/P EST LOW 20 MIN: CPT | Performed by: NURSE PRACTITIONER

## 2022-12-12 RX ORDER — LOSARTAN POTASSIUM AND HYDROCHLOROTHIAZIDE 25; 100 MG/1; MG/1
1 TABLET ORAL DAILY
COMMUNITY
Start: 2022-10-18

## 2022-12-12 ASSESSMENT — ENCOUNTER SYMPTOMS
SORE THROAT: 0
FEVER: 0
COLOR CHANGE: 0
ARTHRALGIAS: 0
PALPITATIONS: 0
SHORTNESS OF BREATH: 0
COUGH: 0
CHILLS: 0
BACK PAIN: 0
HEMATURIA: 0
SEIZURES: 0
VOMITING: 0
ABDOMINAL PAIN: 0
DYSURIA: 0
EYE PAIN: 0

## 2022-12-13 NOTE — ED PROVIDER NOTES
Emergency Medicine Note  HPI   HISTORY OF PRESENT ILLNESS     Patient presents with reported allergic reaction to a new face cream that she started today.   Reports that earlier she had shortness of breath, felt like her heart was racing and had tooth pain.   She reports that all of those symptoms have resolved.   Denies chest pain, shortness of breath or trouble swallowing.            Patient History   PAST HISTORY     Reviewed from Nursing Triage:       Past Medical History:   Diagnosis Date   • Anxiety    • Cirrhosis (CMS/HCC)    • Esophageal varices (CMS/HCC)        No past surgical history on file.    No family history on file.    Social History     Tobacco Use   • Smoking status: Never   • Smokeless tobacco: Never   Substance Use Topics   • Alcohol use: No   • Drug use: No         Review of Systems   REVIEW OF SYSTEMS     Review of Systems   Constitutional: Negative for chills and fever.   HENT: Negative for ear pain and sore throat.    Eyes: Negative for pain and visual disturbance.   Respiratory: Negative for cough and shortness of breath.    Cardiovascular: Negative for chest pain and palpitations.   Gastrointestinal: Negative for abdominal pain and vomiting.   Genitourinary: Negative for dysuria and hematuria.   Musculoskeletal: Negative for arthralgias and back pain.   Skin: Negative for color change and rash.   Neurological: Negative for seizures and syncope.   All other systems reviewed and are negative.        VITALS     ED Vitals    Date/Time Temp Pulse Resp BP SpO2 [facility]   12/12/22 2052 36.8 °C (98.2 °F) 68 16 153/70 97 % DB                       Physical Exam   PHYSICAL EXAM     Physical Exam  Vitals reviewed.   Constitutional:       General: She is not in acute distress.     Appearance: Normal appearance. She is not ill-appearing, toxic-appearing or diaphoretic.   HENT:      Head: Normocephalic and atraumatic.      Mouth/Throat:      Mouth: Mucous membranes are moist.      Pharynx: No oropharyngeal  exudate or posterior oropharyngeal erythema.   Cardiovascular:      Rate and Rhythm: Normal rate.      Heart sounds: Normal heart sounds.   Pulmonary:      Effort: Pulmonary effort is normal. No respiratory distress.      Breath sounds: Normal breath sounds. No stridor. No wheezing, rhonchi or rales.   Musculoskeletal:         General: Normal range of motion.      Cervical back: Normal range of motion. No rigidity.   Skin:     General: Skin is warm and dry.   Neurological:      General: No focal deficit present.      Mental Status: She is alert and oriented to person, place, and time.   Psychiatric:         Mood and Affect: Mood normal.         Behavior: Behavior normal.           PROCEDURES     Procedures     DATA     Results     None              No orders to display       Scoring tools                                  ED Course & MDM   MDM / ED COURSE / CLINICAL IMPRESSION / DISPO     MDM  Number of Diagnoses or Management Options  Allergic reaction, initial encounter  Diagnosis management comments: No symptoms currently.   VS WNL. No chest pain or shortness of breath.   Instructed to follow up with PCP or ED for new or worsening symptoms.   ER for any chest pain or shortness of breath.          Clinical Impression      Allergic reaction, initial encounter     _________________     ED Disposition   Discharge                   Yajaira Cuevas CRNP  12/12/22 4375

## 2022-12-13 NOTE — ED ATTESTATION NOTE
I agree with the PA/NP’s history, physical, assessment, and plan of care, with the following exceptions: None     Manju Smith,   12/12/22 9619

## 2023-01-11 ENCOUNTER — TRANSCRIBE ORDERS (OUTPATIENT)
Dept: SCHEDULING | Age: 63
End: 2023-01-11

## 2023-01-11 DIAGNOSIS — M81.0 AGE-RELATED OSTEOPOROSIS WITHOUT CURRENT PATHOLOGICAL FRACTURE: ICD-10-CM

## 2023-01-11 DIAGNOSIS — Z12.31 ENCOUNTER FOR SCREENING MAMMOGRAM FOR MALIGNANT NEOPLASM OF BREAST: Primary | ICD-10-CM

## 2023-01-26 ENCOUNTER — TRANSCRIBE ORDERS (OUTPATIENT)
Dept: SCHEDULING | Age: 63
End: 2023-01-26

## 2023-01-26 DIAGNOSIS — K70.0 ALCOHOLIC FATTY LIVER: ICD-10-CM

## 2023-01-26 DIAGNOSIS — R94.5 ABNORMAL RESULTS OF LIVER FUNCTION STUDIES: Primary | ICD-10-CM

## 2023-01-26 DIAGNOSIS — K74.60 UNSPECIFIED CIRRHOSIS OF LIVER (CMS/HCC): ICD-10-CM

## 2023-01-26 DIAGNOSIS — K70.30 ALCOHOLIC CIRRHOSIS OF LIVER WITHOUT ASCITES (CMS/HCC): ICD-10-CM

## 2023-02-17 ENCOUNTER — APPOINTMENT (OUTPATIENT)
Dept: LAB | Facility: HOSPITAL | Age: 63
End: 2023-02-17
Attending: INTERNAL MEDICINE
Payer: COMMERCIAL

## 2023-02-17 ENCOUNTER — HOSPITAL ENCOUNTER (OUTPATIENT)
Dept: RADIOLOGY | Facility: HOSPITAL | Age: 63
Discharge: HOME | End: 2023-02-17
Attending: INTERNAL MEDICINE
Payer: COMMERCIAL

## 2023-02-17 ENCOUNTER — TRANSCRIBE ORDERS (OUTPATIENT)
Dept: LAB | Facility: HOSPITAL | Age: 63
End: 2023-02-17

## 2023-02-17 DIAGNOSIS — K70.30 ALCOHOLIC CIRRHOSIS OF LIVER WITHOUT ASCITES (CMS/HCC): ICD-10-CM

## 2023-02-17 DIAGNOSIS — K70.0 ALCOHOLIC FATTY LIVER: ICD-10-CM

## 2023-02-17 DIAGNOSIS — K70.30 ALCOHOLIC CIRRHOSIS OF LIVER WITHOUT ASCITES (CMS/HCC): Primary | ICD-10-CM

## 2023-02-17 DIAGNOSIS — R94.5 ABNORMAL RESULTS OF LIVER FUNCTION STUDIES: ICD-10-CM

## 2023-02-17 DIAGNOSIS — K74.60 UNSPECIFIED CIRRHOSIS OF LIVER (CMS/HCC): ICD-10-CM

## 2023-02-17 LAB
ALBUMIN SERPL-MCNC: 4.2 G/DL (ref 3.4–5)
ALP SERPL-CCNC: 63 IU/L (ref 35–126)
ALT SERPL-CCNC: 18 IU/L (ref 11–54)
ANION GAP SERPL CALC-SCNC: 8 MEQ/L (ref 3–15)
APTT PPP: 27 SEC (ref 23–35)
AST SERPL-CCNC: 23 IU/L (ref 15–41)
BILIRUB SERPL-MCNC: 0.7 MG/DL (ref 0.3–1.2)
BUN SERPL-MCNC: 8 MG/DL (ref 8–20)
CALCIUM SERPL-MCNC: 9.3 MG/DL (ref 8.9–10.3)
CHLORIDE SERPL-SCNC: 103 MEQ/L (ref 98–109)
CO2 SERPL-SCNC: 27 MEQ/L (ref 22–32)
CREAT SERPL-MCNC: 0.7 MG/DL (ref 0.6–1.1)
ERYTHROCYTE [DISTWIDTH] IN BLOOD BY AUTOMATED COUNT: 11.7 % (ref 11.7–14.4)
GFR SERPL CREATININE-BSD FRML MDRD: >60 ML/MIN/1.73M*2
GLUCOSE SERPL-MCNC: 98 MG/DL (ref 70–99)
HCT VFR BLDCO AUTO: 38.8 % (ref 35–45)
HGB BLD-MCNC: 13.8 G/DL (ref 11.8–15.7)
INR PPP: 1.1
MCH RBC QN AUTO: 30.3 PG (ref 28–33.2)
MCHC RBC AUTO-ENTMCNC: 35.6 G/DL (ref 32.2–35.5)
MCV RBC AUTO: 85.3 FL (ref 83–98)
PDW BLD AUTO: 9.9 FL (ref 9.4–12.3)
PLATELET # BLD AUTO: 186 K/UL (ref 150–369)
POTASSIUM SERPL-SCNC: 4.1 MEQ/L (ref 3.6–5.1)
PROT SERPL-MCNC: 6.8 G/DL (ref 6–8.2)
PROTHROMBIN TIME: 13.7 SEC (ref 12.2–14.5)
RBC # BLD AUTO: 4.55 M/UL (ref 3.93–5.22)
SODIUM SERPL-SCNC: 138 MEQ/L (ref 136–144)
WBC # BLD AUTO: 5.77 K/UL (ref 3.8–10.5)

## 2023-02-17 PROCEDURE — 80053 COMPREHEN METABOLIC PANEL: CPT

## 2023-02-17 PROCEDURE — 36415 COLL VENOUS BLD VENIPUNCTURE: CPT

## 2023-02-17 PROCEDURE — 85730 THROMBOPLASTIN TIME PARTIAL: CPT

## 2023-02-17 PROCEDURE — 85027 COMPLETE CBC AUTOMATED: CPT

## 2023-02-17 PROCEDURE — 85610 PROTHROMBIN TIME: CPT

## 2023-02-17 PROCEDURE — 76981 USE PARENCHYMA: CPT

## 2023-03-29 ENCOUNTER — HOSPITAL ENCOUNTER (OUTPATIENT)
Dept: RADIOLOGY | Facility: HOSPITAL | Age: 63
Discharge: HOME | End: 2023-03-29
Payer: COMMERCIAL

## 2023-03-29 DIAGNOSIS — Z12.31 ENCOUNTER FOR SCREENING MAMMOGRAM FOR MALIGNANT NEOPLASM OF BREAST: ICD-10-CM

## 2023-03-29 DIAGNOSIS — M81.0 AGE-RELATED OSTEOPOROSIS WITHOUT CURRENT PATHOLOGICAL FRACTURE: ICD-10-CM

## 2023-03-29 PROCEDURE — 77063 BREAST TOMOSYNTHESIS BI: CPT

## 2023-03-29 PROCEDURE — 77080 DXA BONE DENSITY AXIAL: CPT

## 2023-04-28 ENCOUNTER — TRANSCRIBE ORDERS (OUTPATIENT)
Dept: LAB | Facility: HOSPITAL | Age: 63
End: 2023-04-28

## 2023-04-28 ENCOUNTER — APPOINTMENT (OUTPATIENT)
Dept: LAB | Facility: HOSPITAL | Age: 63
End: 2023-04-28
Attending: FAMILY MEDICINE
Payer: COMMERCIAL

## 2023-04-28 DIAGNOSIS — I10 ESSENTIAL (PRIMARY) HYPERTENSION: ICD-10-CM

## 2023-04-28 DIAGNOSIS — E03.9 HYPOTHYROIDISM, UNSPECIFIED: Primary | ICD-10-CM

## 2023-04-28 DIAGNOSIS — E78.5 HYPERLIPIDEMIA, UNSPECIFIED: ICD-10-CM

## 2023-04-28 DIAGNOSIS — E03.9 HYPOTHYROIDISM, UNSPECIFIED: ICD-10-CM

## 2023-04-28 LAB
ALBUMIN SERPL-MCNC: 4.3 G/DL (ref 3.4–5)
ALP SERPL-CCNC: 57 IU/L (ref 35–126)
ALT SERPL-CCNC: 20 IU/L (ref 11–54)
ANION GAP SERPL CALC-SCNC: 10 MEQ/L (ref 3–15)
AST SERPL-CCNC: 24 IU/L (ref 15–41)
BASOPHILS # BLD: 0.05 K/UL (ref 0.01–0.1)
BASOPHILS NFR BLD: 0.8 %
BILIRUB SERPL-MCNC: 1.1 MG/DL (ref 0.3–1.2)
BILIRUB UR QL STRIP.AUTO: NEGATIVE MG/DL
BUN SERPL-MCNC: 14 MG/DL (ref 8–20)
CALCIUM SERPL-MCNC: 9.4 MG/DL (ref 8.9–10.3)
CHLORIDE SERPL-SCNC: 102 MEQ/L (ref 98–109)
CHOLEST SERPL-MCNC: 225 MG/DL
CLARITY UR REFRACT.AUTO: CLEAR
CO2 SERPL-SCNC: 26 MEQ/L (ref 22–32)
COLOR UR AUTO: COLORLESS
CREAT SERPL-MCNC: 0.8 MG/DL (ref 0.6–1.1)
DIFFERENTIAL METHOD BLD: ABNORMAL
EOSINOPHIL # BLD: 0.06 K/UL (ref 0.04–0.36)
EOSINOPHIL NFR BLD: 1 %
ERYTHROCYTE [DISTWIDTH] IN BLOOD BY AUTOMATED COUNT: 11.6 % (ref 11.7–14.4)
EST. AVERAGE GLUCOSE BLD GHB EST-MCNC: 108 MG/DL
GFR SERPL CREATININE-BSD FRML MDRD: >60 ML/MIN/1.73M*2
GLUCOSE SERPL-MCNC: 115 MG/DL (ref 70–99)
GLUCOSE UR STRIP.AUTO-MCNC: NEGATIVE MG/DL
HBA1C MFR BLD: 5.4 %
HCT VFR BLDCO AUTO: 41 % (ref 35–45)
HDLC SERPL-MCNC: 54 MG/DL
HDLC SERPL: 4.2 {RATIO}
HGB BLD-MCNC: 14.1 G/DL (ref 11.8–15.7)
HGB UR QL STRIP.AUTO: NEGATIVE
IMM GRANULOCYTES # BLD AUTO: 0.01 K/UL (ref 0–0.08)
IMM GRANULOCYTES NFR BLD AUTO: 0.2 %
KETONES UR STRIP.AUTO-MCNC: NEGATIVE MG/DL
LDLC SERPL CALC-MCNC: 153 MG/DL
LEUKOCYTE ESTERASE UR QL STRIP.AUTO: NEGATIVE
LYMPHOCYTES # BLD: 1.48 K/UL (ref 1.2–3.5)
LYMPHOCYTES NFR BLD: 23.7 %
MCH RBC QN AUTO: 29.9 PG (ref 28–33.2)
MCHC RBC AUTO-ENTMCNC: 34.4 G/DL (ref 32.2–35.5)
MCV RBC AUTO: 86.9 FL (ref 83–98)
MONOCYTES # BLD: 0.57 K/UL (ref 0.28–0.8)
MONOCYTES NFR BLD: 9.1 %
NEUTROPHILS # BLD: 4.07 K/UL (ref 1.7–7)
NEUTS SEG NFR BLD: 65.2 %
NITRITE UR QL STRIP.AUTO: NEGATIVE
NONHDLC SERPL-MCNC: 171 MG/DL
NRBC BLD-RTO: 0 %
PDW BLD AUTO: 10.1 FL (ref 9.4–12.3)
PH UR STRIP.AUTO: 7.5 [PH]
PLATELET # BLD AUTO: 190 K/UL (ref 150–369)
POTASSIUM SERPL-SCNC: 4 MEQ/L (ref 3.6–5.1)
PROT SERPL-MCNC: 6.8 G/DL (ref 6–8.2)
PROT UR QL STRIP.AUTO: NEGATIVE
RBC # BLD AUTO: 4.72 M/UL (ref 3.93–5.22)
SODIUM SERPL-SCNC: 138 MEQ/L (ref 136–144)
SP GR UR REFRACT.AUTO: 1.01
TRIGL SERPL-MCNC: 91 MG/DL (ref 30–149)
TSH SERPL DL<=0.05 MIU/L-ACNC: 1.54 MIU/L (ref 0.34–5.6)
UROBILINOGEN UR STRIP-ACNC: 0.2 EU/DL
WBC # BLD AUTO: 6.24 K/UL (ref 3.8–10.5)

## 2023-04-28 PROCEDURE — 85025 COMPLETE CBC W/AUTO DIFF WBC: CPT

## 2023-04-28 PROCEDURE — 81003 URINALYSIS AUTO W/O SCOPE: CPT

## 2023-04-28 PROCEDURE — 84443 ASSAY THYROID STIM HORMONE: CPT

## 2023-04-28 PROCEDURE — 80053 COMPREHEN METABOLIC PANEL: CPT

## 2023-04-28 PROCEDURE — 83036 HEMOGLOBIN GLYCOSYLATED A1C: CPT

## 2023-04-28 PROCEDURE — 80061 LIPID PANEL: CPT

## 2023-04-28 PROCEDURE — 36415 COLL VENOUS BLD VENIPUNCTURE: CPT

## 2024-06-20 ENCOUNTER — APPOINTMENT (RX ONLY)
Dept: URBAN - METROPOLITAN AREA CLINIC 28 | Facility: CLINIC | Age: 64
Setting detail: DERMATOLOGY
End: 2024-06-20

## 2024-06-20 DIAGNOSIS — L71.8 OTHER ROSACEA: ICD-10-CM | Status: INADEQUATELY CONTROLLED

## 2024-06-20 DIAGNOSIS — L91.8 OTHER HYPERTROPHIC DISORDERS OF THE SKIN: ICD-10-CM

## 2024-06-20 DIAGNOSIS — L81.4 OTHER MELANIN HYPERPIGMENTATION: ICD-10-CM

## 2024-06-20 DIAGNOSIS — L82.1 OTHER SEBORRHEIC KERATOSIS: ICD-10-CM

## 2024-06-20 PROCEDURE — 17110 DESTRUCTION B9 LES UP TO 14: CPT

## 2024-06-20 PROCEDURE — ? COUNSELING

## 2024-06-20 PROCEDURE — ? BENIGN DESTRUCTION

## 2024-06-20 PROCEDURE — 99204 OFFICE O/P NEW MOD 45 MIN: CPT | Mod: 25

## 2024-06-20 PROCEDURE — ? PRESCRIPTION

## 2024-06-20 PROCEDURE — ? PRESCRIPTION MEDICATION MANAGEMENT

## 2024-06-20 RX ORDER — METRONIDAZOLE 7.5 MG/G
CREAM TOPICAL
Qty: 45 | Refills: 4 | Status: ERX | COMMUNITY
Start: 2024-06-20

## 2024-06-20 RX ORDER — TACROLIMUS 1 MG/G
OINTMENT TOPICAL QDAY
Qty: 30 | Refills: 6 | Status: ERX | COMMUNITY
Start: 2024-06-20

## 2024-06-20 RX ADMIN — TACROLIMUS: 1 OINTMENT TOPICAL at 00:00

## 2024-06-20 RX ADMIN — METRONIDAZOLE: 7.5 CREAM TOPICAL at 00:00

## 2024-06-20 ASSESSMENT — LOCATION DETAILED DESCRIPTION DERM
LOCATION DETAILED: LEFT PROXIMAL DORSAL FOREARM
LOCATION DETAILED: UPPER STERNUM
LOCATION DETAILED: RIGHT PROXIMAL DORSAL FOREARM
LOCATION DETAILED: LEFT CENTRAL MALAR CHEEK
LOCATION DETAILED: LEFT DISTAL PRETIBIAL REGION
LOCATION DETAILED: LEFT AXILLARY VAULT

## 2024-06-20 ASSESSMENT — LOCATION SIMPLE DESCRIPTION DERM
LOCATION SIMPLE: LEFT FOREARM
LOCATION SIMPLE: LEFT PRETIBIAL REGION
LOCATION SIMPLE: CHEST
LOCATION SIMPLE: RIGHT FOREARM
LOCATION SIMPLE: LEFT CHEEK
LOCATION SIMPLE: LEFT AXILLARY VAULT

## 2024-06-20 ASSESSMENT — LOCATION ZONE DERM
LOCATION ZONE: LEG
LOCATION ZONE: FACE
LOCATION ZONE: TRUNK
LOCATION ZONE: ARM
LOCATION ZONE: AXILLAE

## 2024-06-20 NOTE — PROCEDURE: BENIGN DESTRUCTION
Total Number Of Lesions Treated: 3
Medical Necessity Information: It is in your best interest to select a reason for this procedure from the list below. All of these items fulfill various CMS LCD requirements except the new and changing color options.
Detail Level: Simple
Render Post-Care Instructions In Note?: no
Post-Care Instructions: I reviewed with the patient in detail post-care instructions. Patient is to wear sunprotection, and avoid picking at any of the treated lesions. Pt may apply Vaseline to crusted or scabbing areas.
Medical Necessity Clause: This procedure was medically necessary because the lesions that were treated were:
Consent: The patient's consent was obtained including but not limited to risks of crusting, scabbing, blistering, scarring, darker or lighter pigmentary change, recurrence, incomplete removal and infection.

## 2024-06-20 NOTE — PROCEDURE: PRESCRIPTION MEDICATION MANAGEMENT
Render In Strict Bullet Format?: No
Initiate Treatment: Metronidazole 0.75 % topical cream Apply a thin layer QDAY to AA of face\\nTacrolimus 0.1 % topical ointment Apply a thin layer to face QDAY\\nOk to use Sulfur cleanser daily
Detail Level: Zone

## 2024-12-16 ENCOUNTER — TRANSCRIBE ORDERS (OUTPATIENT)
Dept: SCHEDULING | Age: 64
End: 2024-12-16

## 2024-12-16 DIAGNOSIS — I10 ESSENTIAL (PRIMARY) HYPERTENSION: Primary | ICD-10-CM

## 2025-03-11 ENCOUNTER — HOSPITAL ENCOUNTER (OUTPATIENT)
Dept: RADIOLOGY | Facility: HOSPITAL | Age: 65
Discharge: HOME | End: 2025-03-11
Attending: INTERNAL MEDICINE

## 2025-03-11 DIAGNOSIS — I10 ESSENTIAL (PRIMARY) HYPERTENSION: ICD-10-CM

## 2025-03-11 PROCEDURE — 75571 CT HRT W/O DYE W/CA TEST: CPT

## 2025-03-12 ENCOUNTER — PATIENT OUTREACH (OUTPATIENT)
Dept: HEMATOLOGY/ONCOLOGY | Facility: HOSPITAL | Age: 65
End: 2025-03-12
Payer: COMMERCIAL

## 2025-03-12 PROBLEM — R91.8 MULTIPLE LUNG NODULES ON CT: Status: ACTIVE | Noted: 2025-03-11

## 2025-03-12 NOTE — PROGRESS NOTES
Patient referred to Pulmonary Nodule Program when CT Coronary Calcium Score done on 3/11/2025 had incidental finding of bilateral lung nodules, largest 6 mm left lower lobe.  Follow up CT Chest at 3-6 months is recommended per Fleischner Society guidelines.  Contacted PCP and patient to determine follow up needs.

## 2025-03-12 NOTE — PATIENT INSTRUCTIONS
"THIS DOCUMENT APPEARS AS AN \"AFTER VISIT SUMMARY\" IN [employer].  THIS IS A NON-BILLABLE ENCOUNTER AND THIS NOTE IS FOR RECORD REVIEW OR PATIENT COMMUNICATION PURPOSES.         [address]  [place]  [address]  Phone  [phone]  Fax  [phone]        March 12, 2025    Yasmeen Linares  [address] [address]    Dear Ms. Linares:    You were referred to [facility] Pulmonary Nodule Program when CT Coronary Calcium Score done on 3/11/2025 had incidental finding of small lung nodules.  At least 50% of people have lung nodules by age 50; 95% of lung nodules are NOT cancer but may need more testing.  It is recommended that you have a follow up CT chest in 3-6 months to make sure the nodules have not grown or changed.  Follow up is very important to identify possible problems at an early stage.    I notified your primary care provider, MICHELET Avila, of this result and recommendation.  You should discuss appropriate follow up with your provider.    Please feel free to call me at [phone] if you have any questions or concerns.    Sincerely,        Kymberly Roman, TAMN, RN, OCN  Program Navigator  [phone]        "

## 2025-03-12 NOTE — LETTER
[facility] Pulmonary Nodule [facility]    [address] [facility] [place]  [address]  Phone [phone]      2025    [place] Avila C  [address] [id number]    Re:  Yasmeen Linares  : 1960    Dear Ms. Gamezzel:    Your patient Yasmeen Linares was referred to [facility] Pulmonary Nodule Program after CT Coronary Calcium Score done on 3/11/2025 had incidental finding of bilateral lung nodules, largest 6 mm left lower lobe.  Follow up CT Chest at 3-6 months is recommended per Fleischner Society guidelines.     Although she was seen at [facility] when the CT was done, we understand that Yasmeen Linares is your patient and wanted to make sure that you were aware and would follow her.      Please feel free to contact the nurse navigator at [phone] with any questions.    Sincerely,       DAPHNE Ochoa, RN, OCN  Program Navigator    CC: Yasmeen Linares

## (undated) DEVICE — FORCEP COLD RADIAL JAW